# Patient Record
Sex: MALE | Race: OTHER | HISPANIC OR LATINO
[De-identification: names, ages, dates, MRNs, and addresses within clinical notes are randomized per-mention and may not be internally consistent; named-entity substitution may affect disease eponyms.]

---

## 2019-02-12 PROBLEM — Z00.00 ENCOUNTER FOR PREVENTIVE HEALTH EXAMINATION: Status: ACTIVE | Noted: 2019-02-12

## 2019-03-18 ENCOUNTER — RESULT CHARGE (OUTPATIENT)
Age: 59
End: 2019-03-18

## 2019-03-18 ENCOUNTER — RECORD ABSTRACTING (OUTPATIENT)
Age: 59
End: 2019-03-18

## 2019-03-18 DIAGNOSIS — Z82.3 FAMILY HISTORY OF STROKE: ICD-10-CM

## 2019-03-19 ENCOUNTER — APPOINTMENT (OUTPATIENT)
Dept: CARDIOLOGY | Facility: CLINIC | Age: 59
End: 2019-03-19
Payer: MEDICAID

## 2019-03-19 ENCOUNTER — NON-APPOINTMENT (OUTPATIENT)
Age: 59
End: 2019-03-19

## 2019-03-19 VITALS
RESPIRATION RATE: 16 BRPM | BODY MASS INDEX: 32.65 KG/M2 | HEART RATE: 68 BPM | HEIGHT: 65 IN | SYSTOLIC BLOOD PRESSURE: 160 MMHG | DIASTOLIC BLOOD PRESSURE: 90 MMHG | WEIGHT: 196 LBS

## 2019-03-19 DIAGNOSIS — Z87.898 PERSONAL HISTORY OF OTHER SPECIFIED CONDITIONS: ICD-10-CM

## 2019-03-19 DIAGNOSIS — K76.0 FATTY (CHANGE OF) LIVER, NOT ELSEWHERE CLASSIFIED: ICD-10-CM

## 2019-03-19 PROCEDURE — 99204 OFFICE O/P NEW MOD 45 MIN: CPT

## 2019-03-19 PROCEDURE — 93000 ELECTROCARDIOGRAM COMPLETE: CPT

## 2019-03-19 NOTE — HISTORY OF PRESENT ILLNESS
[FreeTextEntry1] : Patient is a 60yo M with HLD, HTN, obesity here for cardiac evaluation. Last seen in 2014.  and lives with wife. WOrks in kitchen, standing most of the day. NO regular exercise, weight the same. Feeling very tired and dizzy. Tired all day. Dizziness occurs when stands. HAs noted some leg swelling as well, worse at end of day. Snores a lot at night, uses CPAP for FRANK.  Patient denies PND/orthopnea/edema/palpitations/syncope/claudication \par \par ROS: GI negative, all others negative

## 2019-03-19 NOTE — DISCUSSION/SUMMARY
[FreeTextEntry1] : Patient is a 58yo M with HLD, obesity, HTN, IFG, fatty liver here for cardiac evaluation. HAS been vey fatigued/exhausted with reduced exercise tolerance recently. Has multiple cardiac risk factors and needs ischemic evaluation. ALso with abnormal ECG with conduction disease and LAE. Dizzy spells maybe orthostatic, will arrange evaluation for carotid disease as well. He did not take BP meds today and may not regularly due to side effects. Advised given significant HTN he needs to take regularly. \par \par 1. Echo and 2day nuclear stress test to evaluate fatigue/abnormal ECG with mulitple cardiac risk factors\par 2. Carotid to evaluate dizzy spells\par 3. ADvised he take BP meds daily, may need to add agents as well\par 4. Continue ASA/statin\par 5. Recommend aggressive diet and lifestyle modifications, counselled on weight loss\par 6. Will discuss regular exercise program at follow up as long as testing unremarkable \par 7. Follow up after testing  \par 8. Continue CPAP for FRANK, regular pulm follow up

## 2019-03-19 NOTE — ASSESSMENT
[FreeTextEntry1] : ECG: SR, LAE, IVCD, leftward axis\par \par \par  HDL 44   (2/2019)\par A1C 6.3, Fasting glucose 110 (2/2019)

## 2019-04-11 ENCOUNTER — APPOINTMENT (OUTPATIENT)
Dept: CARDIOLOGY | Facility: CLINIC | Age: 59
End: 2019-04-11
Payer: MEDICAID

## 2019-04-11 PROCEDURE — 93306 TTE W/DOPPLER COMPLETE: CPT

## 2019-04-11 PROCEDURE — 93880 EXTRACRANIAL BILAT STUDY: CPT

## 2019-04-25 ENCOUNTER — APPOINTMENT (OUTPATIENT)
Dept: CARDIOLOGY | Facility: CLINIC | Age: 59
End: 2019-04-25
Payer: MEDICAID

## 2019-04-25 PROCEDURE — 93015 CV STRESS TEST SUPVJ I&R: CPT

## 2019-04-25 PROCEDURE — A9500: CPT

## 2019-04-25 PROCEDURE — 78452 HT MUSCLE IMAGE SPECT MULT: CPT

## 2019-04-29 ENCOUNTER — APPOINTMENT (OUTPATIENT)
Dept: CARDIOLOGY | Facility: CLINIC | Age: 59
End: 2019-04-29
Payer: MEDICAID

## 2019-04-29 PROCEDURE — ZZZZZ: CPT

## 2019-05-02 ENCOUNTER — APPOINTMENT (OUTPATIENT)
Dept: CARDIOLOGY | Facility: CLINIC | Age: 59
End: 2019-05-02
Payer: MEDICAID

## 2019-05-02 VITALS
WEIGHT: 200 LBS | BODY MASS INDEX: 33.32 KG/M2 | SYSTOLIC BLOOD PRESSURE: 156 MMHG | HEART RATE: 62 BPM | RESPIRATION RATE: 14 BRPM | OXYGEN SATURATION: 96 % | DIASTOLIC BLOOD PRESSURE: 82 MMHG | HEIGHT: 65 IN

## 2019-05-02 PROCEDURE — 99214 OFFICE O/P EST MOD 30 MIN: CPT

## 2019-05-02 RX ORDER — ATENOLOL AND CHLORTHALIDONE 50; 25 MG/1; MG/1
50-25 TABLET ORAL
Refills: 0 | Status: DISCONTINUED | COMMUNITY
End: 2019-05-02

## 2019-05-02 NOTE — ASSESSMENT
[FreeTextEntry1] : \par \par ECHO 4/2019:\par 1. Borderline increased LV wall thickness.\par 2. Normal left ventricular internal cavity size.\par 3. Left ventricular ejection fraction, by visual estimation, is 60 to 65%.\par 4. Normal right ventricular size and systolic function.\par 5. Mildly dilated left atrium.\par 6. The right atrium is normal in size and structure.\par 7. Normal aortic valve, without any evidence of aortic stenosis or insufficiency.\par 8. Mild mitral valve regurgitation.\par 9. Mild tricuspid regurgitation.\par 10. Interatrial and interventricular septa appear intact\par 11. A prominent moderator band is noted in the RV.\par 12. There is no evidence of pericardial effusion.\par 13. Recommend clinical correlation with the above findings.\par \par CAROTID 4/2019:\par 1. All arteries were clearly visualized.\par 2. Normal LICA.\par 3. Normal JESUS ALBERTO.\par 4. Normal left ECA.\par 5. There is <50% stenosis of the right ECA.\par 6. There is antegrade flow in the right and left vertebral arteries.\par 7. Recommend clinical correlation with the above findings.\par \par EX NUCLEAR STRESS TEST 4/2019:\par 1. Normal Sestamibi myocardial perfusion SPECT imaging with artifact as noted above.\par 2. Left ventricular function was hyperdynamic with an EF of 72%.\par 3. The EKG was negative for ischemia.\par 4. The patient developed leg fatigue during the stress exam. The symptoms resolved with rest.\par 5. The test was terminated due to leg fatigue.\par 6. The blood pressure was hypertensive baseline with normal response to exercise.\par 7. The patient developed no dysrhythmias during exercise.\par 8. The patient's functional capacity was average.\par 9. The Duke Treadmill Score was 9, consistent with low risk.\par 10. When compared to prior study dated 2014, there has been no significant interval change.\par 11. Recommend clinical correlation with the above findings.\par \par  HDL 44   (2/2019)\par A1C 6.3, Fasting glucose 110 (2/2019)

## 2019-05-02 NOTE — HISTORY OF PRESENT ILLNESS
[FreeTextEntry1] : Patient is a 58yo M with HLD, HTN, obesity here for cardiac evaluation. Last seen in 2014.  and lives with wife. WOrks in kitchen, standing most of the day. NO regular exercise, weight the same. Feeling very tired and dizzy. Tired all day. Dizziness occurs when stands. HAs noted some leg swelling as well, worse at end of day. Snores a lot at night, uses CPAP for FRANK.  Patient denies PND/orthopnea/edema/palpitations/syncope/claudication  No new complaints since last visit and underwent cardiac testing. Has been having erectile dysfunction since starting BP meds. \par \par ROS: GI and  negative

## 2019-05-02 NOTE — DISCUSSION/SUMMARY
[FreeTextEntry1] : Patient is a 60yo M with HLD, obesity, HTN, IFG, fatty liver here for cardiac evaluation. Stress test without ischemia and Echo shows normal biventricular function and no clinically significant valvular abnormalities. MR no significant, repeat study 2-3 years. NO clinically significant carotid disease either. \par \par 1. Continue ASA/statin\par 2. Recommend 30 minutes moderate intensity aerobic activity 5 days per week \par 3. Recommend aggressive diet and lifestyle modifications \par 5. BP remains above goal, also ED possibly from atenolol. Will change to Bystolic 10mg daily and continue chlorthalidone. May need to add ARB as well. Needs to exercise and lose weight also\par 6. Follow up in 2 months to further manage HTN \par 7. Continue CPAP for FRANK, pulm follow up

## 2019-05-02 NOTE — PHYSICAL EXAM
[General Appearance - Well Developed] : well developed [Normal Oropharynx] : normal oropharynx [Normal Conjunctiva] : the conjunctiva exhibited no abnormalities [General Appearance - Well Nourished] : well nourished [Normal Jugular Venous V Waves Present] : normal jugular venous V waves present [Auscultation Breath Sounds / Voice Sounds] : lungs were clear to auscultation bilaterally [Respiration, Rhythm And Depth] : normal respiratory rhythm and effort [] : no respiratory distress [Heart Rate And Rhythm] : heart rate and rhythm were normal [Heart Sounds] : normal S1 and S2 [Murmurs] : no murmurs present [Abdomen Soft] : soft [Bowel Sounds] : normal bowel sounds [Abdomen Tenderness] : non-tender [Nail Clubbing] : no clubbing of the fingernails [Cyanosis, Localized] : no localized cyanosis [Abnormal Walk] : normal gait [Skin Color & Pigmentation] : normal skin color and pigmentation [Skin Turgor] : normal skin turgor [Oriented To Time, Place, And Person] : oriented to person, place, and time [Affect] : the affect was normal [FreeTextEntry1] : no edema

## 2019-05-03 ENCOUNTER — RX RENEWAL (OUTPATIENT)
Age: 59
End: 2019-05-03

## 2019-05-03 ENCOUNTER — MEDICATION RENEWAL (OUTPATIENT)
Age: 59
End: 2019-05-03

## 2019-05-03 RX ORDER — NEBIVOLOL HYDROCHLORIDE 10 MG/1
10 TABLET ORAL
Qty: 90 | Refills: 1 | Status: DISCONTINUED | COMMUNITY
Start: 2019-05-02 | End: 2019-05-03

## 2019-05-03 RX ORDER — KIT FOR THE PREPARATION OF TECHNETIUM TC99M SESTAMIBI 1 MG/5ML
INJECTION, POWDER, LYOPHILIZED, FOR SOLUTION PARENTERAL
Refills: 0 | Status: COMPLETED | OUTPATIENT
Start: 2019-05-03

## 2019-05-03 RX ADMIN — KIT FOR THE PREPARATION OF TECHNETIUM TC99M SESTAMIBI 0: 1 INJECTION, POWDER, LYOPHILIZED, FOR SOLUTION PARENTERAL at 00:00

## 2019-05-07 ENCOUNTER — RX CHANGE (OUTPATIENT)
Age: 59
End: 2019-05-07

## 2019-07-18 ENCOUNTER — APPOINTMENT (OUTPATIENT)
Dept: NEUROSURGERY | Facility: CLINIC | Age: 59
End: 2019-07-18
Payer: MEDICAID

## 2019-07-18 ENCOUNTER — NON-APPOINTMENT (OUTPATIENT)
Age: 59
End: 2019-07-18

## 2019-07-18 ENCOUNTER — APPOINTMENT (OUTPATIENT)
Dept: CARDIOLOGY | Facility: CLINIC | Age: 59
End: 2019-07-18
Payer: MEDICAID

## 2019-07-18 VITALS
BODY MASS INDEX: 34.15 KG/M2 | WEIGHT: 200 LBS | HEIGHT: 64 IN | RESPIRATION RATE: 12 BRPM | SYSTOLIC BLOOD PRESSURE: 150 MMHG | HEART RATE: 77 BPM | DIASTOLIC BLOOD PRESSURE: 70 MMHG

## 2019-07-18 VITALS
WEIGHT: 198 LBS | TEMPERATURE: 98.5 F | SYSTOLIC BLOOD PRESSURE: 168 MMHG | BODY MASS INDEX: 33.8 KG/M2 | DIASTOLIC BLOOD PRESSURE: 75 MMHG | HEART RATE: 76 BPM | HEIGHT: 64 IN

## 2019-07-18 VITALS
BODY MASS INDEX: 34.15 KG/M2 | SYSTOLIC BLOOD PRESSURE: 150 MMHG | HEIGHT: 64 IN | DIASTOLIC BLOOD PRESSURE: 70 MMHG | HEART RATE: 77 BPM | WEIGHT: 200 LBS | RESPIRATION RATE: 16 BRPM | OXYGEN SATURATION: 94 %

## 2019-07-18 DIAGNOSIS — Z86.39 PERSONAL HISTORY OF OTHER ENDOCRINE, NUTRITIONAL AND METABOLIC DISEASE: ICD-10-CM

## 2019-07-18 DIAGNOSIS — G89.29 PAIN IN RIGHT SHOULDER: ICD-10-CM

## 2019-07-18 DIAGNOSIS — M25.511 PAIN IN RIGHT SHOULDER: ICD-10-CM

## 2019-07-18 PROCEDURE — 99214 OFFICE O/P EST MOD 30 MIN: CPT

## 2019-07-18 PROCEDURE — 93000 ELECTROCARDIOGRAM COMPLETE: CPT

## 2019-07-18 PROCEDURE — 99203 OFFICE O/P NEW LOW 30 MIN: CPT

## 2019-07-18 RX ORDER — BUSPIRONE HYDROCHLORIDE 5 MG/1
5 TABLET ORAL 3 TIMES DAILY
Refills: 0 | Status: DISCONTINUED | COMMUNITY
End: 2019-07-18

## 2019-07-18 RX ORDER — GABAPENTIN 300 MG
300 TABLET ORAL
Refills: 0 | Status: DISCONTINUED | COMMUNITY
End: 2019-07-18

## 2019-07-18 RX ORDER — DICLOFENAC SODIUM 50 MG/1
50 TABLET, DELAYED RELEASE ORAL
Refills: 0 | Status: DISCONTINUED | COMMUNITY
End: 2019-07-18

## 2019-07-18 RX ORDER — ATORVASTATIN CALCIUM 40 MG/1
40 TABLET, FILM COATED ORAL
Refills: 0 | Status: DISCONTINUED | COMMUNITY
End: 2019-07-18

## 2019-07-18 RX ORDER — DULOXETINE HYDROCHLORIDE 60 MG/1
60 CAPSULE, DELAYED RELEASE ORAL
Refills: 0 | Status: DISCONTINUED | COMMUNITY
End: 2019-07-18

## 2019-07-18 NOTE — REASON FOR VISIT
[New Patient Visit] : a new patient visit [Referred By: _________] : Patient was referred by HETAL [FreeTextEntry1] : radiculopathy, cervical region; right shoulder pain

## 2019-07-18 NOTE — HISTORY OF PRESENT ILLNESS
[FreeTextEntry1] : Patient is a 60yo M with HLD, HTN, obesity here for cardiac follow up.   and lives with wife. HAd negative work up for dizziness/fatigue/tiredness earlier this year. Doing fairly well since. No CP/SOB. Patient denies PND/orthopnea/edema/palpitations/syncope/claudication. Beta blocker changed to metoprolol ER after last visit due to ED from atenolol. (bystolic not covered by insurance). ED better with change in medication. \par \par ROS: GI and  negative

## 2019-07-18 NOTE — DISCUSSION/SUMMARY
[FreeTextEntry1] : Patient is a 60yo M with HLD, obesity, HTN, IFG, fatty liver here for cardiac evaluation. Stress test without ischemia and Echo shows normal biventricular function and no clinically significant valvular abnormalities. MR no significant, repeat study 2-3 years. NO clinically significant carotid disease either. ECG unchanged. REmains dizzy in am, ? BP higher than. Could also be white coat and BP lower at home but more suspicious of the latter. Will add BP med and he will check BP at home\par \par 1. Patient has been off ASA/statin, ? why, Ran out apparently. Restart ASA/atorvastatin 40, check lipids \par 2. Recommend 30 minutes moderate intensity aerobic activity 5 days per week \par 3. Recommend aggressive diet and lifestyle modifications \par 4. Start Valsartan 80mg daily, check BMP/Mg 2 weeks\par 5. Follow up 6 weeks \par 6. Pulm follow up for FRANK, Not on CPAP now\par 7. Monitor BP at home BID for next 2 weeks

## 2019-07-18 NOTE — DATA REVIEWED
[de-identified] : MRI of the cervical spine was reviewed along with the official report.  There is relatively mild DDD with left foraminal stenosis at C6-7.  There is no severe right-sided stenosis.  There is no severe cord compression or signal change.

## 2019-07-18 NOTE — HISTORY OF PRESENT ILLNESS
[> 3 months] : more  than 3 months [FreeTextEntry1] : right shoulder pain [de-identified] : Mr. Reid presents for neurosurgical evaluation of right shoulder pain.  He states that the pain is chronic and does not recall any clear inciting incident or trigger.  The pain is relatively constant and ranges from 7/10 at its best to 9/10 at its worst.  The pain is localized to the right shoulder.  It is relieved with sleeping face up and exacerbated by laying on his right side.  It is also exacerbated with motion.  The patient denies significant neck or radicular pain.  He denies any numbness, tingling or weakness of the extremities.  He does not have any incontinence and is able to ambulate steadily.  See office intake form for full ROS.

## 2019-07-18 NOTE — PHYSICAL EXAM
[General Appearance - Alert] : alert [General Appearance - In No Acute Distress] : in no acute distress [General Appearance - Well Nourished] : well nourished [General Appearance - Well Developed] : well developed [General Appearance - Well-Appearing] : healthy appearing [Oriented To Time, Place, And Person] : oriented to person, place, and time [Person] : oriented to person [Place] : oriented to place [Time] : oriented to time [Short Term Intact] : short term memory intact [Concentration Intact] : normal concentrating ability [Fluency] : fluency intact [Cranial Nerves Optic (II)] : visual acuity intact bilaterally,  pupils equal round and reactive to light [Cranial Nerves Oculomotor (III)] : extraocular motion intact [Cranial Nerves Facial (VII)] : face symmetrical [Cranial Nerves Hypoglossal (XII)] : there was no tongue deviation with protrusion [Motor Tone] : muscle tone was normal in all four extremities [Motor Strength] : muscle strength was normal in all four extremities [Sensation Tactile Decrease] : light touch was intact [Abnormal Walk] : normal gait [___] : absent on the right [___] : absent on the left [FreeTextEntry6] : UE and LE 5/5 bilaterally [FreeTextEntry9] : no Olivera's bilaterally

## 2019-07-18 NOTE — ASSESSMENT
[FreeTextEntry1] : ECG: SR, LAFB \par \par ECHO 4/2019:\par 1. Borderline increased LV wall thickness.\par 2. Normal left ventricular internal cavity size.\par 3. Left ventricular ejection fraction, by visual estimation, is 60 to 65%.\par 4. Normal right ventricular size and systolic function.\par 5. Mildly dilated left atrium.\par 6. The right atrium is normal in size and structure.\par 7. Normal aortic valve, without any evidence of aortic stenosis or insufficiency.\par 8. Mild mitral valve regurgitation.\par 9. Mild tricuspid regurgitation.\par 10. Interatrial and interventricular septa appear intact\par 11. A prominent moderator band is noted in the RV.\par 12. There is no evidence of pericardial effusion.\par 13. Recommend clinical correlation with the above findings.\par \par CAROTID 4/2019:\par 1. All arteries were clearly visualized.\par 2. Normal LICA.\par 3. Normal JESUS ALBERTO.\par 4. Normal left ECA.\par 5. There is <50% stenosis of the right ECA.\par 6. There is antegrade flow in the right and left vertebral arteries.\par 7. Recommend clinical correlation with the above findings.\par \par EX NUCLEAR STRESS TEST 4/2019:\par 1. Normal Sestamibi myocardial perfusion SPECT imaging with artifact as noted above.\par 2. Left ventricular function was hyperdynamic with an EF of 72%.\par 3. The EKG was negative for ischemia.\par 4. The patient developed leg fatigue during the stress exam. The symptoms resolved with rest.\par 5. The test was terminated due to leg fatigue.\par 6. The blood pressure was hypertensive baseline with normal response to exercise.\par 7. The patient developed no dysrhythmias during exercise.\par 8. The patient's functional capacity was average.\par 9. The Duke Treadmill Score was 9, consistent with low risk.\par 10. When compared to prior study dated 2014, there has been no significant interval change.\par 11. Recommend clinical correlation with the above findings.\par \par  HDL 44   (2/2019)\par A1C 6.3, Fasting glucose 110 (2/2019)

## 2019-07-30 ENCOUNTER — RX RENEWAL (OUTPATIENT)
Age: 59
End: 2019-07-30

## 2019-09-09 ENCOUNTER — APPOINTMENT (OUTPATIENT)
Dept: CARDIOLOGY | Facility: CLINIC | Age: 59
End: 2019-09-09

## 2019-10-30 ENCOUNTER — RX RENEWAL (OUTPATIENT)
Age: 59
End: 2019-10-30

## 2019-11-15 ENCOUNTER — APPOINTMENT (OUTPATIENT)
Dept: GASTROENTEROLOGY | Facility: CLINIC | Age: 59
End: 2019-11-15
Payer: MEDICAID

## 2019-11-15 VITALS
DIASTOLIC BLOOD PRESSURE: 70 MMHG | SYSTOLIC BLOOD PRESSURE: 167 MMHG | WEIGHT: 196 LBS | HEART RATE: 79 BPM | BODY MASS INDEX: 33.46 KG/M2 | HEIGHT: 64 IN

## 2019-11-15 PROCEDURE — 82272 OCCULT BLD FECES 1-3 TESTS: CPT

## 2019-11-15 PROCEDURE — 99204 OFFICE O/P NEW MOD 45 MIN: CPT

## 2019-11-15 NOTE — HISTORY OF PRESENT ILLNESS
[Nausea] : denies nausea [None] : had no significant interval events [Constipation] : denies constipation [Yellow Skin Or Eyes (Jaundice)] : denies jaundice [Vomiting] : denies vomiting [Diarrhea] : denies diarrhea [Rectal Pain] : denies rectal pain [Abdominal Swelling] : denies abdominal swelling [Heartburn] : heartburn [Abdominal Pain] : abdominal pain [Wt Gain ___ Lbs] : no recent weight gain [Wt Loss ___ Lbs] : no recent weight loss [GERD] : no gastroesophageal reflux disease [Hiatus Hernia] : no hiatus hernia [Peptic Ulcer Disease] : no peptic ulcer disease [Pancreatitis] : no pancreatitis [Cholelithiasis] : no cholelithiasis [Kidney Stone] : no kidney stone [Inflammatory Bowel Disease] : no inflammatory bowel disease [Irritable Bowel Syndrome] : no irritable bowel syndrome [Diverticulitis] : no diverticulitis [Alcohol Abuse] : no alcohol abuse [Malignancy] : no malignancy [Appendectomy] : no appendectomy [Abdominal Surgery] : no abdominal surgery [Cholecystectomy] : no cholecystectomy [de-identified] : roughly 6 years ago [de-identified] : colonoscopy [de-identified] : Patient presents today for evaluation of chronic postprandial dyspepsia and intermittent low-volume hematochezia status post a negative screening colonoscopy in 2013. He states that his rectal bleeding started roughly 1 month ago and alternates between bright red blood and dark blood mixed with stool in the toilet bowl with no associated abdominal or rectal pain. He also complains of postprandial dyspepsia and bloating and has had no prior upper endoscopies.He has had some associated reflux symptoms with postprandial bloating and pain. [de-identified] : rectal bleeding and dyspepsia

## 2019-11-15 NOTE — REVIEW OF SYSTEMS
[Negative] : Endocrine [As Noted in HPI] : as noted in HPI [Abdominal Pain] : abdominal pain [Vomiting] : no vomiting [Diarrhea] : no diarrhea [Constipation] : no constipation [Heartburn] : no heartburn [Melena] : no melena [FreeTextEntry7] : dyspepsia and rectal bleeding

## 2019-11-15 NOTE — PHYSICAL EXAM
[General Appearance - In No Acute Distress] : in no acute distress [General Appearance - Alert] : alert [General Appearance - Well Nourished] : well nourished [General Appearance - Well Developed] : well developed [General Appearance - Well-Appearing] : healthy appearing [Sclera] : the sclera and conjunctiva were normal [PERRL With Normal Accommodation] : pupils were equal in size, round, and reactive to light [Extraocular Movements] : extraocular movements were intact [Outer Ear] : the ears and nose were normal in appearance [Oropharynx] : the oropharynx was normal [Neck Appearance] : the appearance of the neck was normal [Neck Cervical Mass (___cm)] : no neck mass was observed [Thyroid Diffuse Enlargement] : the thyroid was not enlarged [Jugular Venous Distention Increased] : there was no jugular-venous distention [Thyroid Nodule] : there were no palpable thyroid nodules [Auscultation Breath Sounds / Voice Sounds] : lungs were clear to auscultation bilaterally [Heart Sounds] : normal S1 and S2 [Heart Sounds Gallop] : no gallops [Heart Rate And Rhythm] : heart rate was normal and rhythm regular [Murmurs] : no murmurs [Heart Sounds Pericardial Friction Rub] : no pericardial rub [Bowel Sounds] : normal bowel sounds [Abdomen Soft] : soft [] : no hepato-splenomegaly [Abdomen Mass (___ Cm)] : no abdominal mass palpated [Epigastric] : in the epigastric area [Normal Sphincter Tone] : normal sphincter tone [No Rectal Mass] : no rectal mass [Prostate Enlarged] : was enlarged [Musculoskeletal - Swelling] : no joint swelling seen [Abnormal Walk] : normal gait [No CVA Tenderness] : no ~M costovertebral angle tenderness [Skin Turgor] : normal skin turgor [Skin Color & Pigmentation] : normal skin color and pigmentation [Oriented To Time, Place, And Person] : oriented to person, place, and time [Periumbilical] : not in the periumbilical area [Suprapubic] : not in the suprapubic area [RUQ] : not in the in the right upper quadrant [RLQ] : not in the right lower quadrant [LLQ] : not in the left lower quadrant [LUQ] : not in the left upper quadrant [Internal Hemorrhoid] : no internal hemorrhoids [Occult Blood Positive] : stool was negative for occult blood [External Hemorrhoid] : no external hemorrhoids [FreeTextEntry1] : Hemoccult-negative stool [Prostate Nodule] : did not have a nodule [Prostate Tenderness] : was not tender [Prostate Fluctuant] : was not fluctuant

## 2019-11-15 NOTE — REASON FOR VISIT
[Initial Evaluation] : an initial evaluation [FreeTextEntry1] : for chronic postprandial dyspepsia and rectal bleeding

## 2019-11-15 NOTE — ASSESSMENT
[FreeTextEntry1] : Impression: Low-volume rectal bleeding rule out colorectal neoplasm versus proctitis versus internal hemorrhoidal bleeding. Chronic postprandial dyspepsia rule out ulcer disease and/or gastritis and/or esophagitis and/or H. pylori infection.\par \par Recommendations: Colonoscopy and upper endoscopy were advised for further evaluation of the above. The risks versus benefits of both colonoscopy and upper endoscopy and intravenous sedation, and alternative testing such as virtual colonoscopy and upper GI series, were individually explained to the patient today who appeared to understand all of the above and was agreeable to proceeding with both colonoscopy and upper endoscopy. His ASA classification is 2. He will be prepped with Trilyte for the colonoscopy and is medically optimized for both upper endoscopy and colonoscopy and appeared to understand all of the above instructions and management plan.

## 2019-12-12 ENCOUNTER — OUTPATIENT (OUTPATIENT)
Dept: OUTPATIENT SERVICES | Facility: HOSPITAL | Age: 59
LOS: 1 days | End: 2019-12-12
Payer: COMMERCIAL

## 2019-12-12 ENCOUNTER — APPOINTMENT (OUTPATIENT)
Dept: GASTROENTEROLOGY | Facility: GI CENTER | Age: 59
End: 2019-12-12
Payer: MEDICAID

## 2019-12-12 DIAGNOSIS — K62.5 HEMORRHAGE OF ANUS AND RECTUM: ICD-10-CM

## 2019-12-12 PROCEDURE — 45378 DIAGNOSTIC COLONOSCOPY: CPT

## 2019-12-12 NOTE — PROCEDURE
[Hematochezia] : hematochezia [Procedure Explained] : The procedure was explained [Allergies Reviewed] : allergies reviewed. [Risks] : Risks [Benefits] : benefits [Alternatives] : alternatives [Bleeding] : bleeding risk [Infection] : risk of infection [Consent Obtained] : written consent was obtained prior to the procedure and is detailed in the patient's record [Patient] : the patient [Approved Diet Followed] : the patient avoided solid foods and adhered to the approved diet list for 24 hours prior to the procedure [Bowel Prep Kit] : the patient took the appropriate bowel preparation kit as directed [Automated Blood Pressure Cuff] : automated blood pressure cuff [Cardiac Monitor] : cardiac monitor [Pulse Oximeter] : pulse oximeter [Propofol ___ mg IV] : Propofol [unfilled] ~Umg intravenously [___ L/min Oxygen via NC] : [unfilled] ~Uliters/minute oxygen via nasal cannula [2] : 2 [Sedation Clearance] : the patient was cleared for moderate sedation [Time started: ___] : Start Time:  [unfilled] [Prep Qualtiy: ___] : Prep Quality:  [unfilled] [Withdrawal Time: ___] : Withdrawal Time:  [unfilled] [Time Completed: ___] : Completion Time:  [unfilled] [Performed By: ___] : Performed by:  HETAL [Left Lateral Decubitus] : The patient was positioned in the left lateral decubitus position [Moderately Enlarged Prostate] : a moderately enlarged prostate [Cecum (Landmarks/Transillum)] : and guided to the cecum which was identified by the anatomic landmarks of the appendiceal orifice and ileocecal valve and by transillumination in the right lower quadrant [No Difficulty] : without difficulty [Insufflated] : insufflated [Single Pass Needed] : after a single pass [Retroflex View] : a retroflex view of the rectum was performed [Normal] : Normal [Hemorrhoids] : hemorrhoids [Tolerated Well] : the patient tolerated the procedure well [No Complications] : There were no complications [Vital Signs Stable] : the vital signs were stable [Abnormal Rectum] : a normal rectum [External Hemorrhoids] : no external hemorrhoids [Patient Rotated Into Alternating Positions] : the patient was not rotated [de-identified] : [id number] [de-identified] : Internal hemorrhoids noted on retroflexion. [de-identified] : Internal hemorrhoids o/w normal colonoscopy to the cecum.

## 2019-12-12 NOTE — PHYSICAL EXAM
[General Appearance - Well Nourished] : well nourished [General Appearance - Alert] : alert [General Appearance - In No Acute Distress] : in no acute distress [General Appearance - Well Developed] : well developed [General Appearance - Well-Appearing] : healthy appearing [Sclera] : the sclera and conjunctiva were normal [PERRL With Normal Accommodation] : pupils were equal in size, round, and reactive to light [Extraocular Movements] : extraocular movements were intact [Outer Ear] : the ears and nose were normal in appearance [Oropharynx] : the oropharynx was normal [Neck Appearance] : the appearance of the neck was normal [Neck Cervical Mass (___cm)] : no neck mass was observed [Jugular Venous Distention Increased] : there was no jugular-venous distention [Thyroid Nodule] : there were no palpable thyroid nodules [Thyroid Diffuse Enlargement] : the thyroid was not enlarged [Heart Rate And Rhythm] : heart rate was normal and rhythm regular [Auscultation Breath Sounds / Voice Sounds] : lungs were clear to auscultation bilaterally [Heart Sounds] : normal S1 and S2 [Heart Sounds Gallop] : no gallops [Murmurs] : no murmurs [Heart Sounds Pericardial Friction Rub] : no pericardial rub [Bowel Sounds] : normal bowel sounds [Abdomen Soft] : soft [] : no hepato-splenomegaly [Abdomen Mass (___ Cm)] : no abdominal mass palpated [Epigastric] : in the epigastric area [Normal Sphincter Tone] : normal sphincter tone [No Rectal Mass] : no rectal mass [No CVA Tenderness] : no ~M costovertebral angle tenderness [Abnormal Walk] : normal gait [Musculoskeletal - Swelling] : no joint swelling seen [Skin Color & Pigmentation] : normal skin color and pigmentation [Skin Turgor] : normal skin turgor [Oriented To Time, Place, And Person] : oriented to person, place, and time [Periumbilical] : not in the periumbilical area [Suprapubic] : not in the suprapubic area [RUQ] : not in the in the right upper quadrant [RLQ] : not in the right lower quadrant [LUQ] : not in the left upper quadrant [LLQ] : not in the left lower quadrant [Internal Hemorrhoid] : no internal hemorrhoids [External Hemorrhoid] : no external hemorrhoids [Occult Blood Positive] : stool was negative for occult blood [FreeTextEntry1] : Hemoccult-negative stool

## 2019-12-12 NOTE — PHYSICAL EXAM
[General Appearance - Well Nourished] : well nourished [General Appearance - Alert] : alert [General Appearance - In No Acute Distress] : in no acute distress [General Appearance - Well Developed] : well developed [General Appearance - Well-Appearing] : healthy appearing [Sclera] : the sclera and conjunctiva were normal [PERRL With Normal Accommodation] : pupils were equal in size, round, and reactive to light [Extraocular Movements] : extraocular movements were intact [Outer Ear] : the ears and nose were normal in appearance [Oropharynx] : the oropharynx was normal [Neck Appearance] : the appearance of the neck was normal [Neck Cervical Mass (___cm)] : no neck mass was observed [Jugular Venous Distention Increased] : there was no jugular-venous distention [Thyroid Nodule] : there were no palpable thyroid nodules [Thyroid Diffuse Enlargement] : the thyroid was not enlarged [Heart Rate And Rhythm] : heart rate was normal and rhythm regular [Auscultation Breath Sounds / Voice Sounds] : lungs were clear to auscultation bilaterally [Heart Sounds Gallop] : no gallops [Heart Sounds] : normal S1 and S2 [Murmurs] : no murmurs [Heart Sounds Pericardial Friction Rub] : no pericardial rub [Bowel Sounds] : normal bowel sounds [Abdomen Soft] : soft [] : no hepato-splenomegaly [Epigastric] : in the epigastric area [Abdomen Mass (___ Cm)] : no abdominal mass palpated [Normal Sphincter Tone] : normal sphincter tone [No Rectal Mass] : no rectal mass [No CVA Tenderness] : no ~M costovertebral angle tenderness [Abnormal Walk] : normal gait [Skin Color & Pigmentation] : normal skin color and pigmentation [Musculoskeletal - Swelling] : no joint swelling seen [Skin Turgor] : normal skin turgor [Oriented To Time, Place, And Person] : oriented to person, place, and time [Periumbilical] : not in the periumbilical area [Suprapubic] : not in the suprapubic area [RUQ] : not in the in the right upper quadrant [RLQ] : not in the right lower quadrant [LUQ] : not in the left upper quadrant [LLQ] : not in the left lower quadrant [Internal Hemorrhoid] : no internal hemorrhoids [External Hemorrhoid] : no external hemorrhoids [Occult Blood Positive] : stool was negative for occult blood [FreeTextEntry1] : Hemoccult-negative stool

## 2019-12-12 NOTE — PROCEDURE
[Hematochezia] : hematochezia [Procedure Explained] : The procedure was explained [Allergies Reviewed] : allergies reviewed. [Risks] : Risks [Benefits] : benefits [Alternatives] : alternatives [Bleeding] : bleeding risk [Infection] : risk of infection [Consent Obtained] : written consent was obtained prior to the procedure and is detailed in the patient's record [Patient] : the patient [Bowel Prep Kit] : the patient took the appropriate bowel preparation kit as directed [Approved Diet Followed] : the patient avoided solid foods and adhered to the approved diet list for 24 hours prior to the procedure [Automated Blood Pressure Cuff] : automated blood pressure cuff [Cardiac Monitor] : cardiac monitor [Pulse Oximeter] : pulse oximeter [Propofol ___ mg IV] : Propofol [unfilled] ~Umg intravenously [___ L/min Oxygen via NC] : [unfilled] ~Uliters/minute oxygen via nasal cannula [2] : 2 [Sedation Clearance] : the patient was cleared for moderate sedation [Time started: ___] : Start Time:  [unfilled] [Prep Qualtiy: ___] : Prep Quality:  [unfilled] [Withdrawal Time: ___] : Withdrawal Time:  [unfilled] [Time Completed: ___] : Completion Time:  [unfilled] [Performed By: ___] : Performed by:  HETAL [Left Lateral Decubitus] : The patient was positioned in the left lateral decubitus position [Moderately Enlarged Prostate] : a moderately enlarged prostate [Cecum (Landmarks/Transillum)] : and guided to the cecum which was identified by the anatomic landmarks of the appendiceal orifice and ileocecal valve and by transillumination in the right lower quadrant [No Difficulty] : without difficulty [Insufflated] : insufflated [Single Pass Needed] : after a single pass [Retroflex View] : a retroflex view of the rectum was performed [Normal] : Normal [Hemorrhoids] : hemorrhoids [Tolerated Well] : the patient tolerated the procedure well [No Complications] : There were no complications [Vital Signs Stable] : the vital signs were stable [Patient Rotated Into Alternating Positions] : the patient was not rotated [External Hemorrhoids] : no external hemorrhoids [Abnormal Rectum] : a normal rectum [de-identified] : [id number] [de-identified] : Internal hemorrhoids noted on retroflexion. [de-identified] : Internal hemorrhoids o/w normal colonoscopy to the cecum.

## 2019-12-12 NOTE — REVIEW OF SYSTEMS
[Negative] : Endocrine [Abdominal Pain] : no abdominal pain [Vomiting] : no vomiting [Diarrhea] : no diarrhea [Constipation] : no constipation [Heartburn] : no heartburn [Melena] : no melena [FreeTextEntry7] : rectal bleeding

## 2019-12-12 NOTE — ASSESSMENT
[FreeTextEntry1] : HF diet. RB likely secondary to hemorrhoids. Topical Rx PRN. Repeat screening colonoscopy in ten years.

## 2019-12-12 NOTE — REVIEW OF SYSTEMS
[Negative] : Psychiatric [Abdominal Pain] : no abdominal pain [Vomiting] : no vomiting [Constipation] : no constipation [Diarrhea] : no diarrhea [Heartburn] : no heartburn [Melena] : no melena [FreeTextEntry7] : rectal bleeding

## 2020-01-09 ENCOUNTER — OUTPATIENT (OUTPATIENT)
Dept: OUTPATIENT SERVICES | Facility: HOSPITAL | Age: 60
LOS: 1 days | End: 2020-01-09
Payer: COMMERCIAL

## 2020-01-09 ENCOUNTER — APPOINTMENT (OUTPATIENT)
Dept: GASTROENTEROLOGY | Facility: GI CENTER | Age: 60
End: 2020-01-09
Payer: MEDICAID

## 2020-01-09 ENCOUNTER — RESULT REVIEW (OUTPATIENT)
Age: 60
End: 2020-01-09

## 2020-01-09 DIAGNOSIS — R10.13 EPIGASTRIC PAIN: ICD-10-CM

## 2020-01-09 PROCEDURE — 88342 IMHCHEM/IMCYTCHM 1ST ANTB: CPT | Mod: 26

## 2020-01-09 PROCEDURE — 88305 TISSUE EXAM BY PATHOLOGIST: CPT

## 2020-01-09 PROCEDURE — 43239 EGD BIOPSY SINGLE/MULTIPLE: CPT

## 2020-01-09 PROCEDURE — 88342 IMHCHEM/IMCYTCHM 1ST ANTB: CPT

## 2020-01-09 PROCEDURE — 88305 TISSUE EXAM BY PATHOLOGIST: CPT | Mod: 26

## 2020-01-09 NOTE — PROCEDURE
[With Biopsy] : with biopsy [Dyspepsia] : dyspepsia [Allergies Reviewed] : allergies reviewed. [Procedure Explained] : The procedure was explained [Risks] : Risks [Benefits] : benefits [Alternatives] : alternatives [Consent Obtained] : written consent was obtained prior to the procedure and is detailed in the patient's record [Patient] : the patient [Automated Blood Pressure Cuff] : automated blood pressure cuff [Propofol ___ mg IV] : Propofol [unfilled] ~Umg intravenously [Cardiac Monitor] : cardiac monitor [___ L/min Oxygen via NC] : [unfilled] ~Uliters/minute oxygen via nasal cannula [3] : 3 [Sedation Clearance] : the patient was cleared for moderate sedation [Time started: ___] : Start Time:  [unfilled] [Time Completed: ___] : Completion Time:  [unfilled] [Performed By: ___] : Performed by:  HETAL [Erythema] : erythema [Sent to Pathology] : was sent to pathology for analysis [Normal] : Normal [Tolerated Well] : the patient tolerated the procedure well [Vital Signs Stable] : the vital signs were stable [de-identified] : Moderate gastritis seen [de-identified] : Moderate gastritis seen and biopsied for H. Pylori. [de-identified] : Moderate gastritis seen and biopsied for H. Pylori. [de-identified] : Moderate gastritis seen and biopsied for H. Pylori.

## 2020-01-09 NOTE — PHYSICAL EXAM
[General Appearance - Alert] : alert [General Appearance - In No Acute Distress] : in no acute distress [General Appearance - Well Nourished] : well nourished [General Appearance - Well Developed] : well developed [General Appearance - Well-Appearing] : healthy appearing [Sclera] : the sclera and conjunctiva were normal [PERRL With Normal Accommodation] : pupils were equal in size, round, and reactive to light [Extraocular Movements] : extraocular movements were intact [Oropharynx] : the oropharynx was normal [Outer Ear] : the ears and nose were normal in appearance [Neck Appearance] : the appearance of the neck was normal [Thyroid Diffuse Enlargement] : the thyroid was not enlarged [Thyroid Nodule] : there were no palpable thyroid nodules [Neck Cervical Mass (___cm)] : no neck mass was observed [Jugular Venous Distention Increased] : there was no jugular-venous distention [Auscultation Breath Sounds / Voice Sounds] : lungs were clear to auscultation bilaterally [Heart Rate And Rhythm] : heart rate was normal and rhythm regular [Murmurs] : no murmurs [Heart Sounds] : normal S1 and S2 [Heart Sounds Gallop] : no gallops [Bowel Sounds] : normal bowel sounds [Heart Sounds Pericardial Friction Rub] : no pericardial rub [Abdomen Soft] : soft [] : no hepato-splenomegaly [Epigastric] : in the epigastric area [Abdomen Mass (___ Cm)] : no abdominal mass palpated [Normal Sphincter Tone] : normal sphincter tone [No Rectal Mass] : no rectal mass [No CVA Tenderness] : no ~M costovertebral angle tenderness [Abnormal Walk] : normal gait [Musculoskeletal - Swelling] : no joint swelling seen [Skin Color & Pigmentation] : normal skin color and pigmentation [Skin Turgor] : normal skin turgor [Oriented To Time, Place, And Person] : oriented to person, place, and time [Periumbilical] : not in the periumbilical area [Suprapubic] : not in the suprapubic area [RUQ] : not in the in the right upper quadrant [RLQ] : not in the right lower quadrant [LUQ] : not in the left upper quadrant [LLQ] : not in the left lower quadrant [Internal Hemorrhoid] : no internal hemorrhoids [External Hemorrhoid] : no external hemorrhoids [Occult Blood Positive] : stool was negative for occult blood [FreeTextEntry1] : Hemoccult-negative stool

## 2020-01-09 NOTE — REASON FOR VISIT
[Procedure: _________] : a [unfilled] procedure visit [Endoscopy] : an endoscopy [FreeTextEntry2] : Pt. is here for EGD for chronic dyspepsia

## 2020-01-09 NOTE — ASSESSMENT
[FreeTextEntry1] : Omeprazole 40 mg./d. to Rx the above gastritis pending H. Pylori biopsy results. RTO in 4 weeks.

## 2020-01-09 NOTE — PROCEDURE
[With Biopsy] : with biopsy [Dyspepsia] : dyspepsia [Allergies Reviewed] : allergies reviewed. [Procedure Explained] : The procedure was explained [Risks] : Risks [Benefits] : benefits [Alternatives] : alternatives [Consent Obtained] : written consent was obtained prior to the procedure and is detailed in the patient's record [Patient] : the patient [Automated Blood Pressure Cuff] : automated blood pressure cuff [Cardiac Monitor] : cardiac monitor [Propofol ___ mg IV] : Propofol [unfilled] ~Umg intravenously [___ L/min Oxygen via NC] : [unfilled] ~Uliters/minute oxygen via nasal cannula [3] : 3 [Time started: ___] : Start Time:  [unfilled] [Sedation Clearance] : the patient was cleared for moderate sedation [Performed By: ___] : Performed by:  HETAL [Time Completed: ___] : Completion Time:  [unfilled] [Erythema] : erythema [Normal] : Normal [Sent to Pathology] : was sent to pathology for analysis [Tolerated Well] : the patient tolerated the procedure well [Vital Signs Stable] : the vital signs were stable [de-identified] : Moderate gastritis seen [de-identified] : Moderate gastritis seen and biopsied for H. Pylori. [de-identified] : Moderate gastritis seen and biopsied for H. Pylori. [de-identified] : Moderate gastritis seen and biopsied for H. Pylori.

## 2020-01-15 LAB — SURGICAL PATHOLOGY STUDY: SIGNIFICANT CHANGE UP

## 2020-01-17 ENCOUNTER — APPOINTMENT (OUTPATIENT)
Dept: GASTROENTEROLOGY | Facility: CLINIC | Age: 60
End: 2020-01-17
Payer: MEDICAID

## 2020-01-17 VITALS
DIASTOLIC BLOOD PRESSURE: 72 MMHG | SYSTOLIC BLOOD PRESSURE: 144 MMHG | HEIGHT: 64 IN | WEIGHT: 205 LBS | HEART RATE: 64 BPM | BODY MASS INDEX: 35 KG/M2

## 2020-01-17 PROCEDURE — 99214 OFFICE O/P EST MOD 30 MIN: CPT

## 2020-01-17 NOTE — ASSESSMENT
[FreeTextEntry1] : Impression: Chronic postprandial dyspepsia likely secondary to H. pylori gastritis which has not yet been treated.\par \par Recommendations: Prescriptions for omeprazole 20 mg b.i.d. and amoxicillin 1 g b.i.d. and clarithromycin 500 mg b.i.d. for 2 weeks were sent as pharmacy for H. pylori eradication. His toe for an H. pylori urea breath test 2 weeks after the above therapy has been completed and return here in 6 weeks for followup evaluation. He appeared to understand all of the above instructions, inflammation, and management plan which were explained to him today in Chinese by myself who speaks Chinese.\par

## 2020-01-17 NOTE — PHYSICAL EXAM
[General Appearance - Alert] : alert [General Appearance - In No Acute Distress] : in no acute distress [General Appearance - Well Nourished] : well nourished [General Appearance - Well Developed] : well developed [General Appearance - Well-Appearing] : healthy appearing [Sclera] : the sclera and conjunctiva were normal [PERRL With Normal Accommodation] : pupils were equal in size, round, and reactive to light [Extraocular Movements] : extraocular movements were intact [Outer Ear] : the ears and nose were normal in appearance [Oropharynx] : the oropharynx was normal [Neck Appearance] : the appearance of the neck was normal [Neck Cervical Mass (___cm)] : no neck mass was observed [Jugular Venous Distention Increased] : there was no jugular-venous distention [Thyroid Diffuse Enlargement] : the thyroid was not enlarged [Thyroid Nodule] : there were no palpable thyroid nodules [Auscultation Breath Sounds / Voice Sounds] : lungs were clear to auscultation bilaterally [Heart Rate And Rhythm] : heart rate was normal and rhythm regular [Heart Sounds] : normal S1 and S2 [Heart Sounds Gallop] : no gallops [Murmurs] : no murmurs [Heart Sounds Pericardial Friction Rub] : no pericardial rub [Bowel Sounds] : normal bowel sounds [Abdomen Soft] : soft [] : no hepato-splenomegaly [Abdomen Mass (___ Cm)] : no abdominal mass palpated [Epigastric] : in the epigastric area [Normal Sphincter Tone] : normal sphincter tone [No Rectal Mass] : no rectal mass [No CVA Tenderness] : no ~M costovertebral angle tenderness [Abnormal Walk] : normal gait [Skin Turgor] : normal skin turgor [Musculoskeletal - Swelling] : no joint swelling seen [Skin Color & Pigmentation] : normal skin color and pigmentation [Oriented To Time, Place, And Person] : oriented to person, place, and time [Periumbilical] : not in the periumbilical area [RUQ] : not in the in the right upper quadrant [Suprapubic] : not in the suprapubic area [RLQ] : not in the right lower quadrant [LUQ] : not in the left upper quadrant [LLQ] : not in the left lower quadrant [Internal Hemorrhoid] : no internal hemorrhoids [External Hemorrhoid] : no external hemorrhoids [Occult Blood Positive] : stool was negative for occult blood [FreeTextEntry1] : Hemoccult-negative stool

## 2020-01-17 NOTE — HISTORY OF PRESENT ILLNESS
[None] : had no significant interval events [Heartburn] : denies heartburn [Nausea] : denies nausea [Vomiting] : denies vomiting [Diarrhea] : denies diarrhea [Constipation] : denies constipation [Yellow Skin Or Eyes (Jaundice)] : denies jaundice [Abdominal Swelling] : denies abdominal swelling [Rectal Pain] : denies rectal pain [Abdominal Pain] : abdominal pain [Wt Gain ___ Lbs] : no recent weight gain [Wt Loss ___ Lbs] : no recent weight loss [GERD] : no gastroesophageal reflux disease [Hiatus Hernia] : no hiatus hernia [Peptic Ulcer Disease] : no peptic ulcer disease [Cholelithiasis] : no cholelithiasis [Pancreatitis] : no pancreatitis [Kidney Stone] : no kidney stone [Inflammatory Bowel Disease] : no inflammatory bowel disease [Irritable Bowel Syndrome] : no irritable bowel syndrome [Alcohol Abuse] : no alcohol abuse [Diverticulitis] : no diverticulitis [Malignancy] : no malignancy [Abdominal Surgery] : no abdominal surgery [Appendectomy] : no appendectomy [Cholecystectomy] : no cholecystectomy [de-identified] : January 9, 2020 [de-identified] : EGD with biopsy [de-identified] : Patient presents today for followup evaluation of chronic postprandial dyspepsia status post a recent EGD with biopsy January 9, 2020 which showed H. pylori gastritis which has not yet been treated. He is also status post negative colonoscopy except for internal hemorrhoids done in December 2019. [de-identified] : postprandial dyspepsia

## 2020-01-17 NOTE — REVIEW OF SYSTEMS
[Negative] : Heme/Lymph [As Noted in HPI] : as noted in HPI [Abdominal Pain] : abdominal pain [Constipation] : no constipation [Vomiting] : no vomiting [Diarrhea] : no diarrhea [Heartburn] : no heartburn [Melena] : no melena [FreeTextEntry7] : postprandial dyspepsia

## 2020-01-20 ENCOUNTER — RX CHANGE (OUTPATIENT)
Age: 60
End: 2020-01-20

## 2020-01-28 ENCOUNTER — RX RENEWAL (OUTPATIENT)
Age: 60
End: 2020-01-28

## 2020-01-31 ENCOUNTER — RX RENEWAL (OUTPATIENT)
Age: 60
End: 2020-01-31

## 2020-02-18 ENCOUNTER — RX RENEWAL (OUTPATIENT)
Age: 60
End: 2020-02-18

## 2020-02-28 LAB — UREA BREATH TEST QL: POSITIVE

## 2020-03-11 ENCOUNTER — RX RENEWAL (OUTPATIENT)
Age: 60
End: 2020-03-11

## 2020-03-12 ENCOUNTER — APPOINTMENT (OUTPATIENT)
Dept: GASTROENTEROLOGY | Facility: CLINIC | Age: 60
End: 2020-03-12
Payer: MEDICAID

## 2020-03-12 VITALS
DIASTOLIC BLOOD PRESSURE: 91 MMHG | WEIGHT: 204 LBS | BODY MASS INDEX: 34.83 KG/M2 | HEART RATE: 85 BPM | HEIGHT: 64 IN | SYSTOLIC BLOOD PRESSURE: 179 MMHG

## 2020-03-12 PROCEDURE — 99214 OFFICE O/P EST MOD 30 MIN: CPT

## 2020-03-12 RX ORDER — CLARITHROMYCIN 500 MG/1
500 TABLET, FILM COATED ORAL
Qty: 28 | Refills: 0 | Status: DISCONTINUED | COMMUNITY
Start: 2020-01-17 | End: 2020-03-12

## 2020-03-12 RX ORDER — AMOXICILLIN 500 MG/1
500 CAPSULE ORAL TWICE DAILY
Qty: 56 | Refills: 0 | Status: DISCONTINUED | COMMUNITY
Start: 2020-01-17 | End: 2020-03-12

## 2020-03-12 NOTE — PHYSICAL EXAM
[General Appearance - Alert] : alert [General Appearance - In No Acute Distress] : in no acute distress [General Appearance - Well Nourished] : well nourished [General Appearance - Well Developed] : well developed [General Appearance - Well-Appearing] : healthy appearing [Sclera] : the sclera and conjunctiva were normal [PERRL With Normal Accommodation] : pupils were equal in size, round, and reactive to light [Extraocular Movements] : extraocular movements were intact [Outer Ear] : the ears and nose were normal in appearance [Oropharynx] : the oropharynx was normal [Neck Appearance] : the appearance of the neck was normal [Neck Cervical Mass (___cm)] : no neck mass was observed [Jugular Venous Distention Increased] : there was no jugular-venous distention [Thyroid Diffuse Enlargement] : the thyroid was not enlarged [Thyroid Nodule] : there were no palpable thyroid nodules [Auscultation Breath Sounds / Voice Sounds] : lungs were clear to auscultation bilaterally [Heart Rate And Rhythm] : heart rate was normal and rhythm regular [Heart Sounds] : normal S1 and S2 [Heart Sounds Gallop] : no gallops [Murmurs] : no murmurs [Heart Sounds Pericardial Friction Rub] : no pericardial rub [Bowel Sounds] : normal bowel sounds [Abdomen Soft] : soft [] : no hepato-splenomegaly [Abdomen Mass (___ Cm)] : no abdominal mass palpated [Epigastric] : in the epigastric area [Normal Sphincter Tone] : normal sphincter tone [No Rectal Mass] : no rectal mass [No CVA Tenderness] : no ~M costovertebral angle tenderness [Abnormal Walk] : normal gait [Musculoskeletal - Swelling] : no joint swelling seen [Skin Color & Pigmentation] : normal skin color and pigmentation [Skin Turgor] : normal skin turgor [Oriented To Time, Place, And Person] : oriented to person, place, and time [Periumbilical] : not in the periumbilical area [Suprapubic] : not in the suprapubic area [RUQ] : not in the in the right upper quadrant [RLQ] : not in the right lower quadrant [LUQ] : not in the left upper quadrant [LLQ] : not in the left lower quadrant [Internal Hemorrhoid] : no internal hemorrhoids [External Hemorrhoid] : no external hemorrhoids [Occult Blood Positive] : stool was negative for occult blood [FreeTextEntry1] : Hemoccult-negative stool

## 2020-03-12 NOTE — ASSESSMENT
[FreeTextEntry1] : Impression: Persistent postprandial dyspepsia secondary to H. pylori infection. Previous treatment has been ineffective.\par \par Recommendations: Patient is to be retreated with omeprazole 20 mg twice daily and Levaquin 500 mg once daily and metronidazole 500 mg twice daily for 2 weeks for alternative treatment for his H. pylori infection. He is to go for repeat H. pylori urea breath test 2 weeks after the above therapy has been completed and to return here in 6 weeks for followup evaluation. He appeared to understand all of the above instructions, information, and management plan.

## 2020-03-12 NOTE — HISTORY OF PRESENT ILLNESS
[Heartburn] : denies heartburn [Nausea] : denies nausea [Vomiting] : denies vomiting [Diarrhea] : denies diarrhea [Constipation] : denies constipation [Yellow Skin Or Eyes (Jaundice)] : denies jaundice [Abdominal Pain] : stable abdominal pain [Abdominal Swelling] : denies abdominal swelling [Rectal Pain] : denies rectal pain [Wt Gain ___ Lbs] : no recent weight gain [Wt Loss ___ Lbs] : no recent weight loss [GERD] : no gastroesophageal reflux disease [Hiatus Hernia] : no hiatus hernia [Peptic Ulcer Disease] : no peptic ulcer disease [Pancreatitis] : no pancreatitis [Cholelithiasis] : no cholelithiasis [Kidney Stone] : no kidney stone [Inflammatory Bowel Disease] : no inflammatory bowel disease [Irritable Bowel Syndrome] : no irritable bowel syndrome [Diverticulitis] : no diverticulitis [Alcohol Abuse] : no alcohol abuse [Malignancy] : no malignancy [Abdominal Surgery] : no abdominal surgery [Appendectomy] : no appendectomy [Cholecystectomy] : no cholecystectomy [de-identified] : January 2020 [de-identified] : prescribing triple therapy for H. pylori [de-identified] : H. pylori urea breath test [de-identified] : Patient presents today for followup evaluation of H. pylori  infection with associated nonulcer dyspepsia. When he was last here in January triple therapy with omeprazole amoxicillin and clarithromycin was prescribed for 2 weeks for H. pylori eradication and he subsequently had an H. pylori urea breath test 2 weeks after completion of this therapy which remain positive indicating persistent infection. He was informed of this when he returned for his followup visit today. He continues to have intermittent postprandial dyspepsia. [de-identified] : persistent postprandial dyspepsia

## 2020-04-17 LAB — UREA BREATH TEST QL: NEGATIVE

## 2020-04-23 ENCOUNTER — APPOINTMENT (OUTPATIENT)
Dept: GASTROENTEROLOGY | Facility: CLINIC | Age: 60
End: 2020-04-23

## 2020-05-01 ENCOUNTER — APPOINTMENT (OUTPATIENT)
Dept: GASTROENTEROLOGY | Facility: CLINIC | Age: 60
End: 2020-05-01
Payer: MEDICAID

## 2020-05-01 DIAGNOSIS — K29.00 ACUTE GASTRITIS W/OUT BLEEDING: ICD-10-CM

## 2020-05-01 DIAGNOSIS — A04.8 OTHER SPECIFIED BACTERIAL INTESTINAL INFECTIONS: ICD-10-CM

## 2020-05-01 PROCEDURE — 99443: CPT

## 2020-05-01 NOTE — ASSESSMENT
[FreeTextEntry1] : Impression: H. pylori gastritis successfully treated and eradicated with a negative H. pylori urea breath test post treatment. Symptomatic internal hemorrhoids to do respond to hydrocortisone 25 mg suppositories.\par \par Recommendations: A new prescription for hydrocortisone 25 mg suppositories with refills was sent to his pharmacy today for topical treatment of symptomatic internal hemorrhoids. A high fiber diet for routine colon health and bowel regularity was recommended and explained to the patient today as well. Repeat colonoscopy in 10 years for continued colon cancer screening. He is to return to see me here in the office in roughly 6 months for followup evaluation or sooner if necessary and appeared to understand all of the above instructions, information, and management plan.

## 2020-05-01 NOTE — HISTORY OF PRESENT ILLNESS
[Verbal consent obtained from patient] : the patient, [unfilled] [Heartburn] : denies heartburn [Nausea] : denies nausea [Vomiting] : denies vomiting [Diarrhea] : denies diarrhea [Yellow Skin Or Eyes (Jaundice)] : denies jaundice [Constipation] : denies constipation [Abdominal Pain] : denies abdominal pain [Abdominal Swelling] : denies abdominal swelling [Rectal Pain] : denies rectal pain [Wt Gain ___ Lbs] : no recent weight gain [GERD] : no gastroesophageal reflux disease [Hiatus Hernia] : no hiatus hernia [Wt Loss ___ Lbs] : no recent weight loss [Peptic Ulcer Disease] : no peptic ulcer disease [Pancreatitis] : no pancreatitis [Cholelithiasis] : no cholelithiasis [Kidney Stone] : no kidney stone [Inflammatory Bowel Disease] : no inflammatory bowel disease [Irritable Bowel Syndrome] : no irritable bowel syndrome [Diverticulitis] : no diverticulitis [Alcohol Abuse] : no alcohol abuse [Malignancy] : no malignancy [Abdominal Surgery] : no abdominal surgery [de-identified] : march 12, 2020 [Appendectomy] : no appendectomy [Cholecystectomy] : no cholecystectomy [de-identified] : ordering H. pylori urea breath test [de-identified] : H. pylori urea breath test [de-identified] : Patient presents and is verbal consent for telephonic followup visit for H. pylori gastritis and symptomatic internal hemorrhoids. He is status post an upper endoscopy with biopsy done in January 2020 which showed H. pyloric gastritis treated with triple therapy with a negative posttreatment H. pylori urea breath test done April 16, 2020. He is also status post a negative colonoscopy except for internal hemorrhoids done in December of 2019 and states that he does get intermittent low-volume hematochezia secondary to these internal hemorrhoids which are usually treated successfully with hydrocortisone 25 mg suppositories. He was informed of the above negative H. pylori urea breath test and states that his upper abdominal symptoms have resolved with H. pylori eradication. [de-identified] : low-volume hematochezia

## 2020-05-01 NOTE — REVIEW OF SYSTEMS
[As Noted in HPI] : as noted in HPI [Negative] : Heme/Lymph [Abdominal Pain] : no abdominal pain [Vomiting] : no vomiting [Constipation] : no constipation [Diarrhea] : no diarrhea [Heartburn] : no heartburn [Melena] : no melena [FreeTextEntry7] : low-volume hematochezia

## 2020-07-14 ENCOUNTER — RX RENEWAL (OUTPATIENT)
Age: 60
End: 2020-07-14

## 2020-08-05 ENCOUNTER — RX RENEWAL (OUTPATIENT)
Age: 60
End: 2020-08-05

## 2020-09-24 ENCOUNTER — NON-APPOINTMENT (OUTPATIENT)
Age: 60
End: 2020-09-24

## 2020-09-24 ENCOUNTER — APPOINTMENT (OUTPATIENT)
Dept: CARDIOLOGY | Facility: CLINIC | Age: 60
End: 2020-09-24
Payer: MEDICAID

## 2020-09-24 VITALS
HEIGHT: 64 IN | BODY MASS INDEX: 34.15 KG/M2 | SYSTOLIC BLOOD PRESSURE: 144 MMHG | RESPIRATION RATE: 12 BRPM | WEIGHT: 200 LBS | TEMPERATURE: 98 F | HEART RATE: 73 BPM | DIASTOLIC BLOOD PRESSURE: 76 MMHG

## 2020-09-24 PROCEDURE — 99214 OFFICE O/P EST MOD 30 MIN: CPT

## 2020-09-24 PROCEDURE — 93000 ELECTROCARDIOGRAM COMPLETE: CPT

## 2020-09-24 RX ORDER — METRONIDAZOLE 500 MG/1
500 TABLET ORAL
Qty: 28 | Refills: 0 | Status: DISCONTINUED | COMMUNITY
Start: 2020-03-12 | End: 2020-09-24

## 2020-09-24 RX ORDER — OMEPRAZOLE 40 MG/1
40 CAPSULE, DELAYED RELEASE ORAL
Qty: 30 | Refills: 5 | Status: DISCONTINUED | COMMUNITY
Start: 2020-01-09 | End: 2020-09-24

## 2020-09-24 RX ORDER — LEVOFLOXACIN 500 MG/1
500 TABLET, FILM COATED ORAL DAILY
Qty: 14 | Refills: 0 | Status: DISCONTINUED | COMMUNITY
Start: 2020-03-12 | End: 2020-09-24

## 2020-09-24 NOTE — ASSESSMENT
[FreeTextEntry1] : ECG: SR, LAFB  (unchanged from prior) \par \par ECHO 4/2019:\par 1. Borderline increased LV wall thickness.\par 2. Normal left ventricular internal cavity size.\par 3. Left ventricular ejection fraction, by visual estimation, is 60 to 65%.\par 4. Normal right ventricular size and systolic function.\par 5. Mildly dilated left atrium.\par 6. The right atrium is normal in size and structure.\par 7. Normal aortic valve, without any evidence of aortic stenosis or insufficiency.\par 8. Mild mitral valve regurgitation.\par 9. Mild tricuspid regurgitation.\par 10. Interatrial and interventricular septa appear intact\par 11. A prominent moderator band is noted in the RV.\par 12. There is no evidence of pericardial effusion.\par 13. Recommend clinical correlation with the above findings.\par \par CAROTID 4/2019:\par 1. All arteries were clearly visualized.\par 2. Normal LICA.\par 3. Normal JESUS ALBERTO.\par 4. Normal left ECA.\par 5. There is <50% stenosis of the right ECA.\par 6. There is antegrade flow in the right and left vertebral arteries.\par 7. Recommend clinical correlation with the above findings.\par \par EX NUCLEAR STRESS TEST 4/2019:\par 1. Normal Sestamibi myocardial perfusion SPECT imaging with artifact as noted above.\par 2. Left ventricular function was hyperdynamic with an EF of 72%.\par 3. The EKG was negative for ischemia.\par 4. The patient developed leg fatigue during the stress exam. The symptoms resolved with rest.\par 5. The test was terminated due to leg fatigue.\par 6. The blood pressure was hypertensive baseline with normal response to exercise.\par 7. The patient developed no dysrhythmias during exercise.\par 8. The patient's functional capacity was average.\par 9. The Duke Treadmill Score was 9, consistent with low risk.\par 10. When compared to prior study dated 2014, there has been no significant interval change.\par 11. Recommend clinical correlation with the above findings.\par \par  HDL 44   (2/2019)\par A1C 6.3, Fasting glucose 110 (2/2019)

## 2020-09-24 NOTE — HISTORY OF PRESENT ILLNESS
[FreeTextEntry1] : Patient is a 61yo M with HLD, HTN, former smoker, obesity here for cardiac follow up. HAd negative work up for dizziness/fatigue/tiredness earlier this year. Doing fairly well since. No CP/SOB. Patient denies PND/orthopnea/edema/palpitations/syncope/claudication. 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 \par \par ROS: GI and  negative

## 2020-09-24 NOTE — DISCUSSION/SUMMARY
[FreeTextEntry1] : Patient is a 61yo M with HLD, obesity, HTN, IFG, fatty liver, former smoker here for cardiac follow up. Patient has been doing well without any chest pain or shortness of breath. DOes have daytime fatigue/tiredness, suspect FRANK. Snores a lot at night. BP remains above goal as well. \par \par 1.INcrease Valsartan to 160mg daily\par 2. Recommend 30 minutes moderate intensity aerobic activity 5 days per week \par 3. Recommend aggressive diet and lifestyle modifications \par 4. Follow up 3 months to re-eval BP\par 5. PMD follow up \par 6. REcommend sleep study again as suspect daytime fatigue/tiredness related and would benefit from CPAP. Pulm follow up after\par

## 2020-11-09 ENCOUNTER — RX RENEWAL (OUTPATIENT)
Age: 60
End: 2020-11-09

## 2020-12-03 ENCOUNTER — NON-APPOINTMENT (OUTPATIENT)
Age: 60
End: 2020-12-03

## 2021-03-25 ENCOUNTER — NON-APPOINTMENT (OUTPATIENT)
Age: 61
End: 2021-03-25

## 2021-03-25 ENCOUNTER — APPOINTMENT (OUTPATIENT)
Dept: CARDIOLOGY | Facility: CLINIC | Age: 61
End: 2021-03-25
Payer: MEDICAID

## 2021-03-25 VITALS
DIASTOLIC BLOOD PRESSURE: 76 MMHG | HEIGHT: 64 IN | BODY MASS INDEX: 34.15 KG/M2 | RESPIRATION RATE: 16 BRPM | OXYGEN SATURATION: 95 % | SYSTOLIC BLOOD PRESSURE: 187 MMHG | HEART RATE: 86 BPM | TEMPERATURE: 97.9 F | WEIGHT: 200 LBS

## 2021-03-25 DIAGNOSIS — F32.9 MAJOR DEPRESSIVE DISORDER, SINGLE EPISODE, UNSPECIFIED: ICD-10-CM

## 2021-03-25 PROCEDURE — 93000 ELECTROCARDIOGRAM COMPLETE: CPT

## 2021-03-25 PROCEDURE — 99072 ADDL SUPL MATRL&STAF TM PHE: CPT

## 2021-03-25 PROCEDURE — 99214 OFFICE O/P EST MOD 30 MIN: CPT

## 2021-03-25 RX ORDER — HYDROCORTISONE ACETATE 25 MG/1
25 SUPPOSITORY RECTAL
Qty: 12 | Refills: 5 | Status: DISCONTINUED | COMMUNITY
Start: 2019-12-12 | End: 2021-03-25

## 2021-03-25 RX ORDER — POLYETHYLENE GLYOCOL 3350, SODIUM CHLORIDE, SODIUM BICARBONATE AND POTASSIUM CHLORIDE 420; 11.2; 5.72; 1.48 G/4L; G/4L; G/4L; G/4L
420 POWDER, FOR SOLUTION NASOGASTRIC; ORAL
Qty: 1 | Refills: 0 | Status: DISCONTINUED | COMMUNITY
Start: 2019-11-15 | End: 2021-03-25

## 2021-03-25 RX ORDER — VALSARTAN 80 MG/1
80 TABLET, COATED ORAL DAILY
Refills: 0 | Status: DISCONTINUED | COMMUNITY
End: 2021-03-25

## 2021-03-25 RX ORDER — DULOXETINE HYDROCHLORIDE 60 MG/1
60 CAPSULE, DELAYED RELEASE PELLETS ORAL
Qty: 30 | Refills: 3 | Status: ACTIVE | COMMUNITY
Start: 1900-01-01 | End: 1900-01-01

## 2021-03-25 NOTE — PHYSICAL EXAM
[General Appearance - Well Developed] : well developed [General Appearance - Well Nourished] : well nourished [General Appearance - In No Acute Distress] : no acute distress [Normal Conjunctiva] : the conjunctiva exhibited no abnormalities [Normal Oropharynx] : normal oropharynx [Normal Jugular Venous V Waves Present] : normal jugular venous V waves present [] : no respiratory distress [Respiration, Rhythm And Depth] : normal respiratory rhythm and effort [Auscultation Breath Sounds / Voice Sounds] : lungs were clear to auscultation bilaterally [Heart Rate And Rhythm] : heart rate and rhythm were normal [Heart Sounds] : normal S1 and S2 [Murmurs] : no murmurs present [Edema] : no peripheral edema present [Bowel Sounds] : normal bowel sounds [Abdomen Soft] : soft [Abdomen Tenderness] : non-tender [Abnormal Walk] : normal gait [Nail Clubbing] : no clubbing of the fingernails [Cyanosis, Localized] : no localized cyanosis [Skin Color & Pigmentation] : normal skin color and pigmentation [Skin Turgor] : normal skin turgor [Oriented To Time, Place, And Person] : oriented to person, place, and time [Affect] : the affect was normal [FreeTextEntry1] : obese

## 2021-03-25 NOTE — DISCUSSION/SUMMARY
[FreeTextEntry1] : Patient is a 60yo M with HLD, obesity, HTN, IFG, fatty liver, former smoker here for cardiac follow up. \par -Needs to get back on meds, and contact us if runs out\par -ECG with mild abnormalities and ST changes, will arrange echo\par -Needs to lose weight\par -Planning on scheduling COVID vaccine as well\par \par 1. Restart BP meds, with higher dose Valsartan 160mg daily. Advised to keep list and contact us if needs refills or with questions\par 2. Check BMP/Mg in  a few weeks\par 3. Recommend aggressive diet and lifestyle modifications , counselled on weight loss\par 4. Follow up 3 months to re-eval BP. Echo prior due to ECG changes\par 5. Regular follow up with primary\par 6. Will need to readdress sleep study at follow up \par

## 2021-03-25 NOTE — HISTORY OF PRESENT ILLNESS
[FreeTextEntry1] : Patient is a 60yo M with HLD, HTN, former smoker, obesity here for cardiac follow up. HAd negative work up for dizziness/fatigue/tiredness in 2019. Doing fairly well since. No CP/SOB. Patient denies PND/orthopnea/edema/palpitations/syncope/claudication. Valsartan increased after last OV in 9/2020. Ran out of all meds however and never increased valsartan. NO new complaints. Still feels tired, no change. 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 \par \par ROS: GI and  negative

## 2021-05-06 ENCOUNTER — APPOINTMENT (OUTPATIENT)
Dept: CARDIOLOGY | Facility: CLINIC | Age: 61
End: 2021-05-06
Payer: MEDICAID

## 2021-05-06 VITALS — DIASTOLIC BLOOD PRESSURE: 66 MMHG | SYSTOLIC BLOOD PRESSURE: 134 MMHG

## 2021-05-06 PROCEDURE — 93306 TTE W/DOPPLER COMPLETE: CPT

## 2021-05-06 PROCEDURE — 99072 ADDL SUPL MATRL&STAF TM PHE: CPT

## 2021-06-17 ENCOUNTER — NON-APPOINTMENT (OUTPATIENT)
Age: 61
End: 2021-06-17

## 2021-06-17 ENCOUNTER — APPOINTMENT (OUTPATIENT)
Dept: CARDIOLOGY | Facility: CLINIC | Age: 61
End: 2021-06-17
Payer: MEDICAID

## 2021-06-17 VITALS
HEART RATE: 76 BPM | TEMPERATURE: 97.1 F | DIASTOLIC BLOOD PRESSURE: 86 MMHG | WEIGHT: 203 LBS | SYSTOLIC BLOOD PRESSURE: 160 MMHG | BODY MASS INDEX: 34.66 KG/M2 | HEIGHT: 64 IN | RESPIRATION RATE: 16 BRPM

## 2021-06-17 DIAGNOSIS — R53.83 OTHER FATIGUE: ICD-10-CM

## 2021-06-17 PROCEDURE — 93000 ELECTROCARDIOGRAM COMPLETE: CPT

## 2021-06-17 PROCEDURE — 99214 OFFICE O/P EST MOD 30 MIN: CPT

## 2021-06-17 RX ORDER — CHLORTHALIDONE 25 MG/1
25 TABLET ORAL
Qty: 90 | Refills: 3 | Status: DISCONTINUED | COMMUNITY
Start: 2019-05-02 | End: 2021-06-17

## 2021-06-17 RX ORDER — NAPROXEN 500 MG/1
500 TABLET ORAL
Refills: 0 | Status: DISCONTINUED | COMMUNITY
End: 2021-06-17

## 2021-06-17 RX ORDER — VALSARTAN 160 MG/1
160 TABLET, COATED ORAL DAILY
Qty: 90 | Refills: 2 | Status: DISCONTINUED | COMMUNITY
Start: 2019-07-18 | End: 2021-06-17

## 2021-06-17 NOTE — HISTORY OF PRESENT ILLNESS
[FreeTextEntry1] : Patient is a 62yo M with HLD, HTN, former smoker, obesity here for cardiac follow up. HAd negative work up for dizziness/fatigue/tiredness in 2019. Doing fairly well since. No CP/SOB. Patient denies PND/orthopnea/edema/palpitations/syncope/claudication. Restarted on Valsartan at last OV and echo done. NO new complaints. Still feels tired, no change. 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 \par \par ROS: GI and  negative

## 2021-06-17 NOTE — ASSESSMENT
[FreeTextEntry1] : ECG: SR,LAFB, NSST \par \par ECHO 5/2021:\par 1. Borderline increased septal wall thickness\par 2. Normal systolic and diastolic function, EF 65-70%\par 3. Normal RV/LA/RA \par 4. Trivial MR, mild TR,. RVSP 26mmHg\par \par ECHO 4/2019:\par 1. Borderline increased LV wall thickness.\par 2. Normal left ventricular internal cavity size.\par 3. Left ventricular ejection fraction, by visual estimation, is 60 to 65%.\par 4. Normal right ventricular size and systolic function.\par 5. Mildly dilated left atrium.\par 6. The right atrium is normal in size and structure.\par 7. Normal aortic valve, without any evidence of aortic stenosis or insufficiency.\par 8. Mild mitral valve regurgitation.\par 9. Mild tricuspid regurgitation.\par 10. Interatrial and interventricular septa appear intact\par 11. A prominent moderator band is noted in the RV.\par 12. There is no evidence of pericardial effusion.\par 13. Recommend clinical correlation with the above findings.\par \par CAROTID 4/2019:\par 1. All arteries were clearly visualized.\par 2. Normal LICA.\par 3. Normal JESUS ALBERTO.\par 4. Normal left ECA.\par 5. There is <50% stenosis of the right ECA.\par 6. There is antegrade flow in the right and left vertebral arteries.\par 7. Recommend clinical correlation with the above findings.\par \par EX NUCLEAR STRESS TEST 4/2019:\par 1. Normal Sestamibi myocardial perfusion SPECT imaging with artifact as noted above.\par 2. Left ventricular function was hyperdynamic with an EF of 72%.\par 3. The EKG was negative for ischemia.\par 4. The patient developed leg fatigue during the stress exam. The symptoms resolved with rest.\par 5. The test was terminated due to leg fatigue.\par 6. The blood pressure was hypertensive baseline with normal response to exercise.\par 7. The patient developed no dysrhythmias during exercise.\par 8. The patient's functional capacity was average.\par 9. The Duke Treadmill Score was 9, consistent with low risk.\par 10. When compared to prior study dated 2014, there has been no significant interval change.\par 11. Recommend clinical correlation with the above findings.\par \par  HDL 44   (2/2019)\par A1C 6.3, Fasting glucose 110 (2/2019)

## 2021-08-14 ENCOUNTER — RX RENEWAL (OUTPATIENT)
Age: 61
End: 2021-08-14

## 2021-09-30 ENCOUNTER — NON-APPOINTMENT (OUTPATIENT)
Age: 61
End: 2021-09-30

## 2021-09-30 ENCOUNTER — APPOINTMENT (OUTPATIENT)
Dept: CARDIOLOGY | Facility: CLINIC | Age: 61
End: 2021-09-30
Payer: MEDICAID

## 2021-09-30 VITALS
BODY MASS INDEX: 35 KG/M2 | RESPIRATION RATE: 16 BRPM | HEIGHT: 64 IN | DIASTOLIC BLOOD PRESSURE: 75 MMHG | HEART RATE: 64 BPM | OXYGEN SATURATION: 95 % | WEIGHT: 205 LBS | SYSTOLIC BLOOD PRESSURE: 125 MMHG

## 2021-09-30 PROCEDURE — 93000 ELECTROCARDIOGRAM COMPLETE: CPT

## 2021-09-30 PROCEDURE — 99214 OFFICE O/P EST MOD 30 MIN: CPT

## 2021-09-30 NOTE — HISTORY OF PRESENT ILLNESS
[FreeTextEntry1] : Patient is a 62yo M with HLD, HTN, former smoker, obesity here for cardiac follow up.  No CP/SOB. Patient denies PND/orthopnea/edema/palpitations/syncope/claudication. NO new complaints. Energy level better and feeling well.  HCTZ added last OV and recommended sleep study. Cant do regular exercise due to ankle issue, states needs surgery. 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 \par \par ROS: GI and  negative

## 2021-09-30 NOTE — ASSESSMENT
Take antibiotic as prescribed until finished.  Continue applying warm compresses to the side of the face.  Take Tylenol/ ibuprofen as needed for the pain.    Schedule appointment with your doctor as needed in 5 to 7 days for reevaluation.    Return to emergency department for worsening or concerning symptoms.   [FreeTextEntry1] : ECG: SR. leftward axis,  NSST \par \par ECHO 5/2021:\par 1. Borderline increased septal wall thickness\par 2. Normal systolic and diastolic function, EF 65-70%\par 3. Normal RV/LA/RA \par 4. Trivial MR, mild TR,. RVSP 26mmHg\par \par \par CAROTID 4/2019:\par 1. All arteries were clearly visualized.\par 2. Normal LICA.\par 3. Normal JESUS ALBERTO.\par 4. Normal left ECA.\par 5. There is <50% stenosis of the right ECA.\par 6. There is antegrade flow in the right and left vertebral arteries.\par 7. Recommend clinical correlation with the above findings.\par \par EX NUCLEAR STRESS TEST 4/2019:\par 1. Normal Sestamibi myocardial perfusion SPECT imaging with artifact as noted above.\par 2. Left ventricular function was hyperdynamic with an EF of 72%.\par 3. The EKG was negative for ischemia.\par 4. The patient developed leg fatigue during the stress exam. The symptoms resolved with rest.\par 5. The test was terminated due to leg fatigue.\par 6. The blood pressure was hypertensive baseline with normal response to exercise.\par 7. The patient developed no dysrhythmias during exercise.\par 8. The patient's functional capacity was average.\par 9. The Duke Treadmill Score was 9, consistent with low risk.\par 10. When compared to prior study dated 2014, there has been no significant interval change.\par 11. Recommend clinical correlation with the above findings.\par \par  HDL 44   (2/2019)\par A1C 6.3, Fasting glucose 110 (2/2019) Skin normal color for race, warm, dry and intact. No evidence of rash.

## 2021-09-30 NOTE — DISCUSSION/SUMMARY
[FreeTextEntry1] : Patient is a 62yo M with HLD, obesity, HTN, IFG, fatty liver, former smoker here for cardiac follow up. \par -Echo with borderline increased septal wall, otherwise unremarkable 5/2021 \par -Needs to lose weight still\par -Suspect FRANK complicating HTN, never had sleep study but BP much better now\par \par 1. Continue current antihypertensives, blood pressure is well controlled \par 2.Sleep study, has known FRANK from years ago and needs to be treated/evaluated. Will refer to [facility] \par 3. Recommend aggressive diet and lifestyle modifications , counselled on weight loss\par 4. Follow up 6 months\par 5. Regular follow up with primary\par \par

## 2022-03-24 ENCOUNTER — APPOINTMENT (OUTPATIENT)
Dept: CARDIOLOGY | Facility: CLINIC | Age: 62
End: 2022-03-24
Payer: MEDICAID

## 2022-03-24 ENCOUNTER — NON-APPOINTMENT (OUTPATIENT)
Age: 62
End: 2022-03-24

## 2022-03-24 VITALS
BODY MASS INDEX: 33.97 KG/M2 | SYSTOLIC BLOOD PRESSURE: 143 MMHG | RESPIRATION RATE: 16 BRPM | OXYGEN SATURATION: 95 % | WEIGHT: 199 LBS | HEART RATE: 93 BPM | DIASTOLIC BLOOD PRESSURE: 77 MMHG | HEIGHT: 64 IN

## 2022-03-24 DIAGNOSIS — R73.01 IMPAIRED FASTING GLUCOSE: ICD-10-CM

## 2022-03-24 PROCEDURE — 99214 OFFICE O/P EST MOD 30 MIN: CPT

## 2022-03-24 PROCEDURE — 93000 ELECTROCARDIOGRAM COMPLETE: CPT

## 2022-03-24 RX ORDER — METOPROLOL SUCCINATE 100 MG/1
100 TABLET, EXTENDED RELEASE ORAL
Qty: 90 | Refills: 3 | Status: ACTIVE | COMMUNITY
Start: 2019-05-03 | End: 1900-01-01

## 2022-03-24 RX ORDER — FAMOTIDINE 40 MG/1
40 TABLET, FILM COATED ORAL TWICE DAILY
Qty: 60 | Refills: 5 | Status: DISCONTINUED | COMMUNITY
Start: 2020-03-12 | End: 2022-03-24

## 2022-03-24 RX ORDER — ATORVASTATIN CALCIUM 40 MG/1
40 TABLET, FILM COATED ORAL
Qty: 90 | Refills: 3 | Status: ACTIVE | COMMUNITY
Start: 2019-07-18 | End: 1900-01-01

## 2022-03-24 RX ORDER — OMEPRAZOLE 20 MG/1
20 CAPSULE, DELAYED RELEASE ORAL
Qty: 28 | Refills: 0 | Status: DISCONTINUED | COMMUNITY
Start: 2020-01-17 | End: 2022-03-24

## 2022-03-24 NOTE — PHYSICAL EXAM
[General Appearance - Well Nourished] : well nourished [General Appearance - Well Developed] : well developed [General Appearance - In No Acute Distress] : no acute distress [Normal Conjunctiva] : the conjunctiva exhibited no abnormalities [Normal Oropharynx] : normal oropharynx [Normal Jugular Venous V Waves Present] : normal jugular venous V waves present [] : no respiratory distress [Respiration, Rhythm And Depth] : normal respiratory rhythm and effort [Auscultation Breath Sounds / Voice Sounds] : lungs were clear to auscultation bilaterally [Heart Rate And Rhythm] : heart rate and rhythm were normal [Heart Sounds] : normal S1 and S2 [Murmurs] : no murmurs present [Edema] : no peripheral edema present [Bowel Sounds] : normal bowel sounds [Abdomen Tenderness] : non-tender [Abdomen Soft] : soft [Abnormal Walk] : normal gait [Nail Clubbing] : no clubbing of the fingernails [Cyanosis, Localized] : no localized cyanosis [Skin Color & Pigmentation] : normal skin color and pigmentation [Skin Turgor] : normal skin turgor [Affect] : the affect was normal [Oriented To Time, Place, And Person] : oriented to person, place, and time [FreeTextEntry1] : obese

## 2022-03-24 NOTE — DISCUSSION/SUMMARY
[FreeTextEntry1] : Patient is a 63yo M with HLD, obesity, HTN, IFG, fatty liver, former smoker here for cardiac follow up. \par -Echo with borderline increased septal wall, otherwise unremarkable 5/2021 \par -Needs to lose weight still\par -Suspect FRANK complicating HTN, never had sleep study but BP much better now\par \par 1. Continue current antihypertensives, blood pressure is well controlled . High today as missed his meds this morning\par 2.Still recommend sleep study, has known FRANK from years ago and needs to be treated/evaluated. Will refer again to [facility] as still not done yet and willing to do now. \par 3. Recommend aggressive diet and lifestyle modifications , counselled on weight loss\par 4. Regular follow up with primary\par 5. Follow up 6 months\par

## 2022-03-24 NOTE — HISTORY OF PRESENT ILLNESS
[FreeTextEntry1] : Patient is a 63yo M with HLD, HTN, former smoker, obesity here for cardiac follow up.  No CP/SOB. Patient denies PND/orthopnea/edema/palpitations/syncope/claudication. NO new complaints. Energy level better and feeling well.  HCTZ added last OV and recommended sleep study. Had COVID last fall and sick at home 10-11 days, had trouble breathing and exhausted. Having some low back pain, worse when sits and tries to get up. More sednetary lately due to back pain. 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 \par \par ROS: GI and  negative

## 2022-03-24 NOTE — ASSESSMENT
[FreeTextEntry1] : ECG: SR, LAFB \par \par ECHO 5/2021:\par 1. Borderline increased septal wall thickness\par 2. Normal systolic and diastolic function, EF 65-70%\par 3. Normal RV/LA/RA \par 4. Trivial MR, mild TR,. RVSP 26mmHg\par \par \par CAROTID 4/2019:\par 1. All arteries were clearly visualized.\par 2. Normal ICAs\par 3. There is <50% stenosis of the right ECA.\par 4. There is antegrade flow in the right and left vertebral arteries.\par \par \par EX NUCLEAR STRESS TEST 4/2019:\par 1. Normal Sestamibi myocardial perfusion SPECT imaging with artifact as noted above.\par 2. Left ventricular function was hyperdynamic with an EF of 72%.\par 3. The EKG was negative for ischemia.\par 4. The blood pressure was hypertensive baseline with normal response to exercise.\par 5. The patient's functional capacity was average.\par 6. The Duke Treadmill Score was 9, consistent with low risk.\par \par  HDL 44   (2/2019)\par A1C 6.3, Fasting glucose 110 (2/2019)

## 2022-04-07 ENCOUNTER — NON-APPOINTMENT (OUTPATIENT)
Age: 62
End: 2022-04-07

## 2022-04-12 ENCOUNTER — APPOINTMENT (OUTPATIENT)
Dept: GASTROENTEROLOGY | Facility: CLINIC | Age: 62
End: 2022-04-12
Payer: MEDICAID

## 2022-04-12 VITALS
RESPIRATION RATE: 14 BRPM | TEMPERATURE: 97 F | SYSTOLIC BLOOD PRESSURE: 140 MMHG | DIASTOLIC BLOOD PRESSURE: 80 MMHG | WEIGHT: 200 LBS | BODY MASS INDEX: 34.15 KG/M2 | HEIGHT: 64 IN | OXYGEN SATURATION: 98 % | HEART RATE: 74 BPM

## 2022-04-12 DIAGNOSIS — K64.8 OTHER HEMORRHOIDS: ICD-10-CM

## 2022-04-12 DIAGNOSIS — K62.5 HEMORRHAGE OF ANUS AND RECTUM: ICD-10-CM

## 2022-04-12 DIAGNOSIS — R10.13 EPIGASTRIC PAIN: ICD-10-CM

## 2022-04-12 PROCEDURE — 99215 OFFICE O/P EST HI 40 MIN: CPT

## 2022-04-12 PROCEDURE — 82272 OCCULT BLD FECES 1-3 TESTS: CPT

## 2022-04-12 NOTE — PHYSICAL EXAM
[Sclera] : the sclera and conjunctiva were normal [PERRL With Normal Accommodation] : pupils were equal in size, round, and reactive to light [Extraocular Movements] : extraocular movements were intact [Outer Ear] : the ears and nose were normal in appearance [Oropharynx] : the oropharynx was normal [Neck Appearance] : the appearance of the neck was normal [Neck Cervical Mass (___cm)] : no neck mass was observed [Jugular Venous Distention Increased] : there was no jugular-venous distention [Thyroid Diffuse Enlargement] : the thyroid was not enlarged [Thyroid Nodule] : there were no palpable thyroid nodules [Auscultation Breath Sounds / Voice Sounds] : lungs were clear to auscultation bilaterally [Heart Rate And Rhythm] : heart rate was normal and rhythm regular [Heart Sounds] : normal S1 and S2 [Heart Sounds Gallop] : no gallops [Murmurs] : no murmurs [Heart Sounds Pericardial Friction Rub] : no pericardial rub [Bowel Sounds] : normal bowel sounds [Abdomen Soft] : soft [] : no hepato-splenomegaly [Abdomen Mass (___ Cm)] : no abdominal mass palpated [Epigastric] : in the epigastric area [Normal Sphincter Tone] : normal sphincter tone [No Rectal Mass] : no rectal mass [No CVA Tenderness] : no ~M costovertebral angle tenderness [Abnormal Walk] : normal gait [Musculoskeletal - Swelling] : no joint swelling seen [Skin Color & Pigmentation] : normal skin color and pigmentation [Skin Turgor] : normal skin turgor [Oriented To Time, Place, And Person] : oriented to person, place, and time [Periumbilical] : not in the periumbilical area [Suprapubic] : not in the suprapubic area [RUQ] : not in the in the right upper quadrant [RLQ] : not in the right lower quadrant [LUQ] : not in the left upper quadrant [LLQ] : not in the left lower quadrant [Internal Hemorrhoid] : no internal hemorrhoids [External Hemorrhoid] : no external hemorrhoids [Occult Blood Positive] : stool was negative for occult blood [FreeTextEntry1] : Hemoccult-positive stool with perianal inflammation

## 2022-04-12 NOTE — ASSESSMENT
[FreeTextEntry1] : Impression: Recurrent dyspepsia and rectal bleeding rule out ulcer disease and/or gastritis and/or esophagitis and/or recurrent H. pylori infection and/or colorectal neoplasm and/or recurrent internal hemorrhoidal bleeding.\par \par Recommendations: Hydrocortisone 2.5% cream was prescribed for topical treatment of his perianal inflammation pending repeat upper endoscopy and colonoscopy which were advised for further evaluation of the above.  The risk versus benefits of repeat colonoscopy and upper endoscopy and intravenous sedation, and alternative testing such as virtual colonoscopy and upper GI series, were individually explained to the patient today who appeared to understand all of the above and was agreeable to proceeding with both procedures.  His ASA classification is 2.  He will be prepped with [facility] for the colonoscopy and is medically optimized for the proposed colonoscopy and appeared to understand all of the above instructions, information, and management plan.

## 2022-04-12 NOTE — HISTORY OF PRESENT ILLNESS
[None] : had no significant interval events [Heartburn] : denies heartburn [Nausea] : denies nausea [Vomiting] : denies vomiting [Diarrhea] : denies diarrhea [Constipation] : denies constipation [Yellow Skin Or Eyes (Jaundice)] : denies jaundice [Abdominal Swelling] : denies abdominal swelling [Abdominal Pain] : abdominal pain [Rectal Pain] : rectal pain [Wt Gain ___ Lbs] : no recent weight gain [Wt Loss ___ Lbs] : no recent weight loss [GERD] : no gastroesophageal reflux disease [Hiatus Hernia] : no hiatus hernia [Peptic Ulcer Disease] : no peptic ulcer disease [Pancreatitis] : no pancreatitis [Cholelithiasis] : no cholelithiasis [Kidney Stone] : no kidney stone [Inflammatory Bowel Disease] : no inflammatory bowel disease [Irritable Bowel Syndrome] : no irritable bowel syndrome [Diverticulitis] : no diverticulitis [Alcohol Abuse] : no alcohol abuse [Malignancy] : no malignancy [Abdominal Surgery] : no abdominal surgery [Appendectomy] : no appendectomy [Cholecystectomy] : no cholecystectomy [de-identified] : May 2020 [de-identified] : Patient presents for follow-up evaluation of recurrent postprandial dyspepsia and rectal bleeding status post upper endoscopy with biopsy in 2020 which showed H. pylori gastritis which was treated and successfully eradicated with a negative H. pylori urea breath test in April 2020.  He also had a negative colonoscopy except for internal hemorrhoids done in 2019 for rectal bleeding.  He presents today with recurrent upper abdominal pain as well as intermittent low-volume to moderate volume hematochezia which he describes as painful. [de-identified] : Dyspepsia and rectal bleeding

## 2022-04-12 NOTE — REVIEW OF SYSTEMS
[As Noted in HPI] : as noted in HPI [Abdominal Pain] : abdominal pain [Negative] : Heme/Lymph [Vomiting] : no vomiting [Constipation] : no constipation [Diarrhea] : no diarrhea [Heartburn] : no heartburn [Melena] : no melena [FreeTextEntry7] : Dyspepsia and rectal bleeding

## 2022-05-05 ENCOUNTER — APPOINTMENT (OUTPATIENT)
Dept: GASTROENTEROLOGY | Facility: GI CENTER | Age: 62
End: 2022-05-05

## 2022-12-01 ENCOUNTER — NON-APPOINTMENT (OUTPATIENT)
Age: 62
End: 2022-12-01

## 2022-12-01 ENCOUNTER — APPOINTMENT (OUTPATIENT)
Dept: CARDIOLOGY | Facility: CLINIC | Age: 62
End: 2022-12-01

## 2022-12-01 VITALS
SYSTOLIC BLOOD PRESSURE: 190 MMHG | DIASTOLIC BLOOD PRESSURE: 86 MMHG | WEIGHT: 204 LBS | BODY MASS INDEX: 34.83 KG/M2 | HEART RATE: 88 BPM | RESPIRATION RATE: 14 BRPM | HEIGHT: 64 IN

## 2022-12-01 VITALS — DIASTOLIC BLOOD PRESSURE: 80 MMHG | SYSTOLIC BLOOD PRESSURE: 180 MMHG

## 2022-12-01 DIAGNOSIS — E66.9 OBESITY, UNSPECIFIED: ICD-10-CM

## 2022-12-01 DIAGNOSIS — Z87.891 PERSONAL HISTORY OF NICOTINE DEPENDENCE: ICD-10-CM

## 2022-12-01 PROCEDURE — 93000 ELECTROCARDIOGRAM COMPLETE: CPT

## 2022-12-01 PROCEDURE — 99214 OFFICE O/P EST MOD 30 MIN: CPT | Mod: 25

## 2022-12-01 NOTE — HISTORY OF PRESENT ILLNESS
[FreeTextEntry1] : Patient is a 63yo M with HLD, HTN, former smoker, obesity here for cardiac follow up.  No CP/SOB. Patient denies PND/orthopnea/edema/palpitations/syncope/claudication. NO new complaints. Energy level better and feeling well.  HCTZ added last OV and recommended sleep study. struggles with chronic LBP. 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 \par \par ROS: GI and  negative

## 2022-12-01 NOTE — ASSESSMENT
[FreeTextEntry1] : ECG: SR, LAFB , LAE, NSST\par \par ECHO 5/2021:\par 1. Borderline increased septal wall thickness\par 2. Normal systolic and diastolic function, EF 65-70%\par 3. Normal RV/LA/RA \par 4. Trivial MR, mild TR,. RVSP 26mmHg\par \par \par CAROTID 4/2019:\par 1. All arteries were clearly visualized.\par 2. Normal ICAs\par 3. There is <50% stenosis of the right ECA.\par 4. There is antegrade flow in the right and left vertebral arteries.\par \par \par EX NUCLEAR STRESS TEST 4/2019:\par 1. Normal Sestamibi myocardial perfusion SPECT imaging with artifact as noted above.\par 2. Left ventricular function was hyperdynamic with an EF of 72%.\par 3. The EKG was negative for ischemia.\par 4. The blood pressure was hypertensive baseline with normal response to exercise.\par 5. The patient's functional capacity was average.\par 6. The Duke Treadmill Score was 9, consistent with low risk.\par \par  HDL 44   (2/2019)\par A1C 6.3, Fasting glucose 110 (2/2019)

## 2022-12-01 NOTE — DISCUSSION/SUMMARY
[FreeTextEntry1] : Patient is a 63yo M with HLD, obesity, HTN, IFG, fatty liver, former smoker here for cardiac follow up. \par -Echo with borderline increased septal wall, otherwise unremarkable 5/2021 \par -Needs to lose weight still\par -Suspect FRANK complicating HTN, never had sleep study , BP had been better but now high again. Was rushing around and appears anxious. Will add amlodipine, counselled diet/weight loss and re-evaluate within 2 months. ALso echo to evaluate minor changes in ST segments on  ECG and increase in BP\par \par 1. ADd amlodipine 5mg daily, diet/exercise/weight loss as well for BP. \par 2.Still recommend sleep study, has known FRANK from years ago and needs to be treated/evaluated. Will refer again to [facility] as still not done yet and willing to do now. \par 3. Consider changing HCTZ to chlorthalidone at follow up or increasing CCB. ALso needs echo, known LAFB but some changes in ST segments and will evaluate\par 4. Regular follow up with primary\par 5. Follow up within 2 months\par  [EKG obtained to assist in diagnosis and management of assessed problem(s)] : EKG obtained to assist in diagnosis and management of assessed problem(s)

## 2023-02-08 NOTE — HISTORY OF PRESENT ILLNESS
[FreeTextEntry1] : Patient is a 61yo M with HLD, HTN, former smoker, obesity here for cardiac follow up.  No CP/SOB. Patient denies PND/orthopnea/edema/palpitations/syncope/claudication. NO new complaints. struggles with chronic LBP still. Amlodipine added last OV for better BP control. Recommended echo and sleep study but not done, referred to [facility]. 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 \par \par ROS: GI and  negative

## 2023-02-08 NOTE — PHYSICAL EXAM
[General Appearance - Well Developed] : well developed [General Appearance - Well Nourished] : well nourished [General Appearance - In No Acute Distress] : no acute distress [Normal Conjunctiva] : the conjunctiva exhibited no abnormalities [Normal Oropharynx] : normal oropharynx [Normal Jugular Venous V Waves Present] : normal jugular venous V waves present [] : no respiratory distress [Respiration, Rhythm And Depth] : normal respiratory rhythm and effort [Auscultation Breath Sounds / Voice Sounds] : lungs were clear to auscultation bilaterally [Heart Rate And Rhythm] : heart rate and rhythm were normal [Heart Sounds] : normal S1 and S2 [Murmurs] : no murmurs present [Edema] : no peripheral edema present [Bowel Sounds] : normal bowel sounds [Abdomen Soft] : soft [Abdomen Tenderness] : non-tender [FreeTextEntry1] : obese [Abnormal Walk] : normal gait [Nail Clubbing] : no clubbing of the fingernails [Cyanosis, Localized] : no localized cyanosis [Skin Color & Pigmentation] : normal skin color and pigmentation [Skin Turgor] : normal skin turgor [Oriented To Time, Place, And Person] : oriented to person, place, and time [Affect] : the affect was normal

## 2023-02-08 NOTE — DISCUSSION/SUMMARY
[FreeTextEntry1] : Patient is a 64yo M with HLD, obesity, HTN, IFG, fatty liver, former smoker here for cardiac follow up. \par -Echo with borderline increased septal wall, otherwise unremarkable 5/2021 \par -Needs to lose weight still\par -Suspect FRANK complicating HTN, never had sleep study , BP had been better but now high again. Was rushing around and appears anxious. Will add amlodipine, counselled diet/weight loss and re-evaluate within 2 months. ALso echo to evaluate minor changes in ST segments on  ECG and increase in BP\par \par 1. \par 2.Still recommend sleep study, has known FRANK from years ago and needs to be treated/evaluated. Will refer again to [facility] as still not done yet and willing to do now. \par 3. Consider changing HCTZ to chlorthalidone at follow up or increasing CCB. \par 4. Regular follow up with primary\par 5. Follow up within 2 months\par

## 2023-02-09 ENCOUNTER — APPOINTMENT (OUTPATIENT)
Dept: CARDIOLOGY | Facility: CLINIC | Age: 63
End: 2023-02-09

## 2023-05-17 RX ORDER — AMLODIPINE BESYLATE 5 MG/1
5 TABLET ORAL
Qty: 90 | Refills: 1 | Status: ACTIVE | COMMUNITY
Start: 2022-12-01 | End: 1900-01-01

## 2023-08-14 ENCOUNTER — OFFICE (OUTPATIENT)
Dept: URBAN - METROPOLITAN AREA CLINIC 63 | Facility: CLINIC | Age: 63
Setting detail: OPHTHALMOLOGY
End: 2023-08-14
Payer: COMMERCIAL

## 2023-08-14 DIAGNOSIS — E11.9: ICD-10-CM

## 2023-08-14 DIAGNOSIS — H01.005: ICD-10-CM

## 2023-08-14 DIAGNOSIS — H01.002: ICD-10-CM

## 2023-08-14 DIAGNOSIS — H25.13: ICD-10-CM

## 2023-08-14 PROCEDURE — 92004 COMPRE OPH EXAM NEW PT 1/>: CPT | Performed by: STUDENT IN AN ORGANIZED HEALTH CARE EDUCATION/TRAINING PROGRAM

## 2023-08-14 ASSESSMENT — REFRACTION_AUTOREFRACTION
OS_AXIS: 059
OS_SPHERE: -19.00
OD_AXIS: 022
OS_CYLINDER: -1.00
OD_SPHERE: +1.00
OD_CYLINDER: -0.25

## 2023-08-14 ASSESSMENT — KERATOMETRY
OD_AXISANGLE_DEGREES: 100
OD_K2POWER_DIOPTERS: 43.75
OS_K1POWER_DIOPTERS: 43.50
OD_K1POWER_DIOPTERS: 42.25
OS_AXISANGLE_DEGREES: 081
OS_K2POWER_DIOPTERS: 44.50

## 2023-08-14 ASSESSMENT — LID EXAM ASSESSMENTS
OS_BLEPHARITIS: LLL 1+
OD_BLEPHARITIS: RLL 1+

## 2023-08-14 ASSESSMENT — SPHEQUIV_DERIVED
OD_SPHEQUIV: 0.875
OS_SPHEQUIV: -19.5

## 2023-08-14 ASSESSMENT — TONOMETRY
OS_IOP_MMHG: 17
OD_IOP_MMHG: 18

## 2023-08-14 ASSESSMENT — VISUAL ACUITY
OS_BCVA: 20/20
OD_BCVA: 20/30

## 2023-08-14 ASSESSMENT — REFRACTION_MANIFEST
OS_VA1: 20/20
OD_VA1: 20/20
OS_ADD: +2.00
OS_SPHERE: -0.50
OD_ADD: +2.00
OD_SPHERE: +1.00

## 2023-08-14 ASSESSMENT — AXIALLENGTH_DERIVED
OD_AL: 23.4357
OS_AL: 34.4

## 2023-08-28 ENCOUNTER — OFFICE (OUTPATIENT)
Dept: URBAN - METROPOLITAN AREA CLINIC 63 | Facility: CLINIC | Age: 63
Setting detail: OPHTHALMOLOGY
End: 2023-08-28
Payer: COMMERCIAL

## 2023-08-28 DIAGNOSIS — H16.223: ICD-10-CM

## 2023-08-28 PROBLEM — H52.4 PRESBYOPIA: Status: ACTIVE | Noted: 2023-08-14

## 2023-08-28 PROBLEM — H25.13 CATARACT SENILE NUCLEAR SCLEROSIS; BOTH EYES: Status: ACTIVE | Noted: 2023-08-14

## 2023-08-28 PROBLEM — E11.9 DIABETES TYPE 2 NO RETINOPATHY ; BOTH EYES: Status: ACTIVE | Noted: 2023-08-14

## 2023-08-28 PROCEDURE — 68761 CLOSE TEAR DUCT OPENING: CPT | Performed by: STUDENT IN AN ORGANIZED HEALTH CARE EDUCATION/TRAINING PROGRAM

## 2023-08-28 PROCEDURE — 92012 INTRM OPH EXAM EST PATIENT: CPT | Performed by: STUDENT IN AN ORGANIZED HEALTH CARE EDUCATION/TRAINING PROGRAM

## 2023-08-28 ASSESSMENT — SPHEQUIV_DERIVED
OD_SPHEQUIV: 0.75
OS_SPHEQUIV: -20.125

## 2023-08-28 ASSESSMENT — CONFRONTATIONAL VISUAL FIELD TEST (CVF)
OS_FINDINGS: FULL
OD_FINDINGS: FULL

## 2023-08-28 ASSESSMENT — REFRACTION_MANIFEST
OD_SPHERE: +1.00
OS_ADD: +2.00
OD_ADD: +2.00
OS_VA1: 20/20
OD_VA1: 20/20
OS_SPHERE: -0.50

## 2023-08-28 ASSESSMENT — VISUAL ACUITY
OS_BCVA: 20/20
OD_BCVA: 20/20

## 2023-08-28 ASSESSMENT — REFRACTION_AUTOREFRACTION
OS_AXIS: 101
OD_CYLINDER: -1.00
OD_SPHERE: +1.25
OS_CYLINDER: -0.75
OS_SPHERE: -19.75
OD_AXIS: 007

## 2023-08-28 ASSESSMENT — KERATOMETRY
OD_K1POWER_DIOPTERS: 42.75
OS_K2POWER_DIOPTERS: 45.00
OS_AXISANGLE_DEGREES: 084
OD_AXISANGLE_DEGREES: 091
OD_K2POWER_DIOPTERS: 44.25
OS_K1POWER_DIOPTERS: 44.00

## 2023-08-28 ASSESSMENT — TONOMETRY
OD_IOP_MMHG: 19
OS_IOP_MMHG: 17

## 2023-08-28 ASSESSMENT — LID EXAM ASSESSMENTS
OD_BLEPHARITIS: RLL 1+
OS_BLEPHARITIS: LLL 1+

## 2023-08-28 ASSESSMENT — AXIALLENGTH_DERIVED
OD_AL: 23.3033
OS_AL: 34.53

## 2023-09-11 ENCOUNTER — OFFICE (OUTPATIENT)
Dept: URBAN - METROPOLITAN AREA CLINIC 63 | Facility: CLINIC | Age: 63
Setting detail: OPHTHALMOLOGY
End: 2023-09-11
Payer: COMMERCIAL

## 2023-09-11 DIAGNOSIS — H16.223: ICD-10-CM

## 2023-09-11 PROCEDURE — 92012 INTRM OPH EXAM EST PATIENT: CPT | Performed by: STUDENT IN AN ORGANIZED HEALTH CARE EDUCATION/TRAINING PROGRAM

## 2023-09-11 ASSESSMENT — KERATOMETRY
OS_K1POWER_DIOPTERS: 43.75
OD_AXISANGLE_DEGREES: 088
OD_K2POWER_DIOPTERS: 44.75
OS_K2POWER_DIOPTERS: 44.50
OD_K1POWER_DIOPTERS: 42.75
OS_AXISANGLE_DEGREES: 075

## 2023-09-11 ASSESSMENT — TONOMETRY
OD_IOP_MMHG: 20
OS_IOP_MMHG: 19

## 2023-09-11 ASSESSMENT — REFRACTION_AUTOREFRACTION
OD_SPHERE: +1.25
OS_SPHERE: -19.50
OD_CYLINDER: -0.50
OS_CYLINDER: -0.50
OD_AXIS: 006
OS_AXIS: 071

## 2023-09-11 ASSESSMENT — AXIALLENGTH_DERIVED
OD_AL: 23.1201
OS_AL: 34.51

## 2023-09-11 ASSESSMENT — REFRACTION_MANIFEST
OS_ADD: +2.00
OS_VA1: 20/20
OS_SPHERE: -0.50
OD_SPHERE: +1.00
OD_ADD: +2.00
OD_VA1: 20/20

## 2023-09-11 ASSESSMENT — CONFRONTATIONAL VISUAL FIELD TEST (CVF)
OD_FINDINGS: FULL
OS_FINDINGS: FULL

## 2023-09-11 ASSESSMENT — VISUAL ACUITY
OS_BCVA: 20/25
OD_BCVA: 20/25

## 2023-09-11 ASSESSMENT — SPHEQUIV_DERIVED
OD_SPHEQUIV: 1
OS_SPHEQUIV: -19.75

## 2023-09-11 ASSESSMENT — LID EXAM ASSESSMENTS
OD_BLEPHARITIS: RLL 1+
OS_BLEPHARITIS: LLL 1+

## 2023-09-28 ENCOUNTER — OFFICE (OUTPATIENT)
Dept: URBAN - METROPOLITAN AREA CLINIC 63 | Facility: CLINIC | Age: 63
Setting detail: OPHTHALMOLOGY
End: 2023-09-28
Payer: COMMERCIAL

## 2023-09-28 ENCOUNTER — RX ONLY (RX ONLY)
Age: 63
End: 2023-09-28

## 2023-09-28 DIAGNOSIS — H02.423: ICD-10-CM

## 2023-09-28 DIAGNOSIS — H02.524: ICD-10-CM

## 2023-09-28 DIAGNOSIS — H02.831: ICD-10-CM

## 2023-09-28 DIAGNOSIS — H02.521: ICD-10-CM

## 2023-09-28 PROBLEM — H02.832 DERMATOCHALASIS; RIGHT UPPER LID, RIGHT LOWER LID, LEFT UPPER LID, LEFT LOWER LID: Status: ACTIVE | Noted: 2023-09-28

## 2023-09-28 PROBLEM — H02.422 PTOSIS MYOGENIC; RIGHT EYE, LEFT EYE: Status: ACTIVE | Noted: 2023-09-28

## 2023-09-28 PROBLEM — H02.834 DERMATOCHALASIS; RIGHT UPPER LID, RIGHT LOWER LID, LEFT UPPER LID, LEFT LOWER LID: Status: ACTIVE | Noted: 2023-09-28

## 2023-09-28 PROBLEM — H02.835 DERMATOCHALASIS; RIGHT UPPER LID, RIGHT LOWER LID, LEFT UPPER LID, LEFT LOWER LID: Status: ACTIVE | Noted: 2023-09-28

## 2023-09-28 PROBLEM — H02.421 PTOSIS MYOGENIC; RIGHT EYE, LEFT EYE: Status: ACTIVE | Noted: 2023-09-28

## 2023-09-28 PROCEDURE — 92083 EXTENDED VISUAL FIELD XM: CPT | Performed by: OPHTHALMOLOGY

## 2023-09-28 PROCEDURE — 92285 EXTERNAL OCULAR PHOTOGRAPHY: CPT | Performed by: OPHTHALMOLOGY

## 2023-09-28 PROCEDURE — 99214 OFFICE O/P EST MOD 30 MIN: CPT | Performed by: OPHTHALMOLOGY

## 2023-09-28 ASSESSMENT — REFRACTION_MANIFEST
OS_SPHERE: -0.50
OS_ADD: +2.00
OD_SPHERE: +1.00
OS_VA1: 20/20
OD_ADD: +2.00
OD_VA1: 20/20

## 2023-09-28 ASSESSMENT — SPHEQUIV_DERIVED
OD_SPHEQUIV: 1
OS_SPHEQUIV: -19.75

## 2023-09-28 ASSESSMENT — LID EXAM ASSESSMENTS
OS_BLEPHARITIS: LLL 1+
OD_BLEPHARITIS: RLL 1+

## 2023-09-28 ASSESSMENT — REFRACTION_AUTOREFRACTION
OD_AXIS: 006
OS_AXIS: 071
OD_SPHERE: +1.25
OS_CYLINDER: -0.50
OD_CYLINDER: -0.50
OS_SPHERE: -19.50

## 2023-09-28 ASSESSMENT — AXIALLENGTH_DERIVED
OS_AL: 34.51
OD_AL: 23.1201

## 2023-09-28 ASSESSMENT — CONFRONTATIONAL VISUAL FIELD TEST (CVF)
OD_FINDINGS: FULL
OS_FINDINGS: FULL

## 2023-09-28 ASSESSMENT — KERATOMETRY
OS_K2POWER_DIOPTERS: 44.50
OD_AXISANGLE_DEGREES: 088
OD_K2POWER_DIOPTERS: 44.75
OD_K1POWER_DIOPTERS: 42.75
OS_AXISANGLE_DEGREES: 075
OS_K1POWER_DIOPTERS: 43.75

## 2023-09-28 ASSESSMENT — TONOMETRY
OS_IOP_MMHG: 18
OD_IOP_MMHG: 18

## 2023-09-28 ASSESSMENT — VISUAL ACUITY
OS_BCVA: 20/25+1
OD_BCVA: 20/20-1

## 2023-12-03 ENCOUNTER — TRANSCRIPTION ENCOUNTER (OUTPATIENT)
Age: 63
End: 2023-12-03

## 2023-12-04 ENCOUNTER — OUTPATIENT (OUTPATIENT)
Dept: OUTPATIENT SERVICES | Facility: HOSPITAL | Age: 63
LOS: 1 days | End: 2023-12-04
Payer: COMMERCIAL

## 2023-12-04 DIAGNOSIS — M72.2 PLANTAR FASCIAL FIBROMATOSIS: ICD-10-CM

## 2023-12-04 LAB
GLUCOSE BLDC GLUCOMTR-MCNC: 110 MG/DL — HIGH (ref 70–99)
GLUCOSE BLDC GLUCOMTR-MCNC: 110 MG/DL — HIGH (ref 70–99)

## 2023-12-04 PROCEDURE — 20550 NJX 1 TENDON SHEATH/LIGAMENT: CPT | Mod: 50

## 2023-12-04 PROCEDURE — 77002 NEEDLE LOCALIZATION BY XRAY: CPT

## 2023-12-04 PROCEDURE — 82962 GLUCOSE BLOOD TEST: CPT

## 2024-01-07 ENCOUNTER — TRANSCRIPTION ENCOUNTER (OUTPATIENT)
Age: 64
End: 2024-01-07

## 2024-01-08 ENCOUNTER — OUTPATIENT (OUTPATIENT)
Dept: OUTPATIENT SERVICES | Facility: HOSPITAL | Age: 64
LOS: 1 days | End: 2024-01-08
Payer: COMMERCIAL

## 2024-01-08 DIAGNOSIS — M54.16 RADICULOPATHY, LUMBAR REGION: ICD-10-CM

## 2024-01-08 DIAGNOSIS — M25.561 PAIN IN RIGHT KNEE: ICD-10-CM

## 2024-01-08 DIAGNOSIS — M25.562 PAIN IN LEFT KNEE: ICD-10-CM

## 2024-01-08 LAB
GLUCOSE BLDC GLUCOMTR-MCNC: 108 MG/DL — HIGH (ref 70–99)
GLUCOSE BLDC GLUCOMTR-MCNC: 108 MG/DL — HIGH (ref 70–99)

## 2024-01-08 PROCEDURE — 76000 FLUOROSCOPY <1 HR PHYS/QHP: CPT

## 2024-01-08 PROCEDURE — 82962 GLUCOSE BLOOD TEST: CPT

## 2024-01-08 PROCEDURE — 64483 NJX AA&/STRD TFRM EPI L/S 1: CPT | Mod: 50

## 2024-01-15 ENCOUNTER — EMERGENCY (EMERGENCY)
Facility: HOSPITAL | Age: 64
LOS: 1 days | Discharge: HOME | End: 2024-01-15
Attending: EMERGENCY MEDICINE
Payer: COMMERCIAL

## 2024-01-15 VITALS
WEIGHT: 191.36 LBS | TEMPERATURE: 99 F | DIASTOLIC BLOOD PRESSURE: 80 MMHG | SYSTOLIC BLOOD PRESSURE: 200 MMHG | OXYGEN SATURATION: 98 % | RESPIRATION RATE: 18 BRPM | HEART RATE: 63 BPM

## 2024-01-15 LAB
ALBUMIN SERPL ELPH-MCNC: 4.1 G/DL — SIGNIFICANT CHANGE UP (ref 3.3–5.2)
ALBUMIN SERPL ELPH-MCNC: 4.1 G/DL — SIGNIFICANT CHANGE UP (ref 3.3–5.2)
ALP SERPL-CCNC: 97 U/L — SIGNIFICANT CHANGE UP (ref 40–120)
ALP SERPL-CCNC: 97 U/L — SIGNIFICANT CHANGE UP (ref 40–120)
ALT FLD-CCNC: 27 U/L — SIGNIFICANT CHANGE UP
ALT FLD-CCNC: 27 U/L — SIGNIFICANT CHANGE UP
ANION GAP SERPL CALC-SCNC: 14 MMOL/L — SIGNIFICANT CHANGE UP (ref 5–17)
ANION GAP SERPL CALC-SCNC: 14 MMOL/L — SIGNIFICANT CHANGE UP (ref 5–17)
AST SERPL-CCNC: 29 U/L — SIGNIFICANT CHANGE UP
AST SERPL-CCNC: 29 U/L — SIGNIFICANT CHANGE UP
BASOPHILS # BLD AUTO: 0.08 K/UL — SIGNIFICANT CHANGE UP (ref 0–0.2)
BASOPHILS # BLD AUTO: 0.08 K/UL — SIGNIFICANT CHANGE UP (ref 0–0.2)
BASOPHILS NFR BLD AUTO: 0.7 % — SIGNIFICANT CHANGE UP (ref 0–2)
BASOPHILS NFR BLD AUTO: 0.7 % — SIGNIFICANT CHANGE UP (ref 0–2)
BILIRUB SERPL-MCNC: 0.8 MG/DL — SIGNIFICANT CHANGE UP (ref 0.4–2)
BILIRUB SERPL-MCNC: 0.8 MG/DL — SIGNIFICANT CHANGE UP (ref 0.4–2)
BLD GP AB SCN SERPL QL: SIGNIFICANT CHANGE UP
BLD GP AB SCN SERPL QL: SIGNIFICANT CHANGE UP
BUN SERPL-MCNC: 19.7 MG/DL — SIGNIFICANT CHANGE UP (ref 8–20)
BUN SERPL-MCNC: 19.7 MG/DL — SIGNIFICANT CHANGE UP (ref 8–20)
CALCIUM SERPL-MCNC: 9.2 MG/DL — SIGNIFICANT CHANGE UP (ref 8.4–10.5)
CALCIUM SERPL-MCNC: 9.2 MG/DL — SIGNIFICANT CHANGE UP (ref 8.4–10.5)
CHLORIDE SERPL-SCNC: 98 MMOL/L — SIGNIFICANT CHANGE UP (ref 96–108)
CHLORIDE SERPL-SCNC: 98 MMOL/L — SIGNIFICANT CHANGE UP (ref 96–108)
CO2 SERPL-SCNC: 21 MMOL/L — LOW (ref 22–29)
CO2 SERPL-SCNC: 21 MMOL/L — LOW (ref 22–29)
CREAT SERPL-MCNC: 0.65 MG/DL — SIGNIFICANT CHANGE UP (ref 0.5–1.3)
CREAT SERPL-MCNC: 0.65 MG/DL — SIGNIFICANT CHANGE UP (ref 0.5–1.3)
EGFR: 107 ML/MIN/1.73M2 — SIGNIFICANT CHANGE UP
EGFR: 107 ML/MIN/1.73M2 — SIGNIFICANT CHANGE UP
EOSINOPHIL # BLD AUTO: 0.17 K/UL — SIGNIFICANT CHANGE UP (ref 0–0.5)
EOSINOPHIL # BLD AUTO: 0.17 K/UL — SIGNIFICANT CHANGE UP (ref 0–0.5)
EOSINOPHIL NFR BLD AUTO: 1.4 % — SIGNIFICANT CHANGE UP (ref 0–6)
EOSINOPHIL NFR BLD AUTO: 1.4 % — SIGNIFICANT CHANGE UP (ref 0–6)
GLUCOSE SERPL-MCNC: 118 MG/DL — HIGH (ref 70–99)
GLUCOSE SERPL-MCNC: 118 MG/DL — HIGH (ref 70–99)
HCT VFR BLD CALC: 48 % — SIGNIFICANT CHANGE UP (ref 39–50)
HCT VFR BLD CALC: 48 % — SIGNIFICANT CHANGE UP (ref 39–50)
HGB BLD-MCNC: 16.4 G/DL — SIGNIFICANT CHANGE UP (ref 13–17)
HGB BLD-MCNC: 16.4 G/DL — SIGNIFICANT CHANGE UP (ref 13–17)
IMM GRANULOCYTES NFR BLD AUTO: 1.2 % — HIGH (ref 0–0.9)
IMM GRANULOCYTES NFR BLD AUTO: 1.2 % — HIGH (ref 0–0.9)
LYMPHOCYTES # BLD AUTO: 29.1 % — SIGNIFICANT CHANGE UP (ref 13–44)
LYMPHOCYTES # BLD AUTO: 29.1 % — SIGNIFICANT CHANGE UP (ref 13–44)
LYMPHOCYTES # BLD AUTO: 3.51 K/UL — HIGH (ref 1–3.3)
LYMPHOCYTES # BLD AUTO: 3.51 K/UL — HIGH (ref 1–3.3)
MCHC RBC-ENTMCNC: 27.4 PG — SIGNIFICANT CHANGE UP (ref 27–34)
MCHC RBC-ENTMCNC: 27.4 PG — SIGNIFICANT CHANGE UP (ref 27–34)
MCHC RBC-ENTMCNC: 34.2 GM/DL — SIGNIFICANT CHANGE UP (ref 32–36)
MCHC RBC-ENTMCNC: 34.2 GM/DL — SIGNIFICANT CHANGE UP (ref 32–36)
MCV RBC AUTO: 80.3 FL — SIGNIFICANT CHANGE UP (ref 80–100)
MCV RBC AUTO: 80.3 FL — SIGNIFICANT CHANGE UP (ref 80–100)
MONOCYTES # BLD AUTO: 1.13 K/UL — HIGH (ref 0–0.9)
MONOCYTES # BLD AUTO: 1.13 K/UL — HIGH (ref 0–0.9)
MONOCYTES NFR BLD AUTO: 9.4 % — SIGNIFICANT CHANGE UP (ref 2–14)
MONOCYTES NFR BLD AUTO: 9.4 % — SIGNIFICANT CHANGE UP (ref 2–14)
NEUTROPHILS # BLD AUTO: 7.05 K/UL — SIGNIFICANT CHANGE UP (ref 1.8–7.4)
NEUTROPHILS # BLD AUTO: 7.05 K/UL — SIGNIFICANT CHANGE UP (ref 1.8–7.4)
NEUTROPHILS NFR BLD AUTO: 58.2 % — SIGNIFICANT CHANGE UP (ref 43–77)
NEUTROPHILS NFR BLD AUTO: 58.2 % — SIGNIFICANT CHANGE UP (ref 43–77)
PLATELET # BLD AUTO: 387 K/UL — SIGNIFICANT CHANGE UP (ref 150–400)
PLATELET # BLD AUTO: 387 K/UL — SIGNIFICANT CHANGE UP (ref 150–400)
POTASSIUM SERPL-MCNC: 4.1 MMOL/L — SIGNIFICANT CHANGE UP (ref 3.5–5.3)
POTASSIUM SERPL-MCNC: 4.1 MMOL/L — SIGNIFICANT CHANGE UP (ref 3.5–5.3)
POTASSIUM SERPL-SCNC: 4.1 MMOL/L — SIGNIFICANT CHANGE UP (ref 3.5–5.3)
POTASSIUM SERPL-SCNC: 4.1 MMOL/L — SIGNIFICANT CHANGE UP (ref 3.5–5.3)
PROT SERPL-MCNC: 7.6 G/DL — SIGNIFICANT CHANGE UP (ref 6.6–8.7)
PROT SERPL-MCNC: 7.6 G/DL — SIGNIFICANT CHANGE UP (ref 6.6–8.7)
RBC # BLD: 5.98 M/UL — HIGH (ref 4.2–5.8)
RBC # BLD: 5.98 M/UL — HIGH (ref 4.2–5.8)
RBC # FLD: 14 % — SIGNIFICANT CHANGE UP (ref 10.3–14.5)
RBC # FLD: 14 % — SIGNIFICANT CHANGE UP (ref 10.3–14.5)
SODIUM SERPL-SCNC: 133 MMOL/L — LOW (ref 135–145)
SODIUM SERPL-SCNC: 133 MMOL/L — LOW (ref 135–145)
TROPONIN T, HIGH SENSITIVITY RESULT: 8 NG/L — SIGNIFICANT CHANGE UP (ref 0–51)
TROPONIN T, HIGH SENSITIVITY RESULT: 8 NG/L — SIGNIFICANT CHANGE UP (ref 0–51)
WBC # BLD: 12.08 K/UL — HIGH (ref 3.8–10.5)
WBC # BLD: 12.08 K/UL — HIGH (ref 3.8–10.5)
WBC # FLD AUTO: 12.08 K/UL — HIGH (ref 3.8–10.5)
WBC # FLD AUTO: 12.08 K/UL — HIGH (ref 3.8–10.5)

## 2024-01-15 PROCEDURE — 99223 1ST HOSP IP/OBS HIGH 75: CPT

## 2024-01-15 PROCEDURE — 71045 X-RAY EXAM CHEST 1 VIEW: CPT | Mod: 26

## 2024-01-15 PROCEDURE — 70450 CT HEAD/BRAIN W/O DYE: CPT | Mod: 26,MA

## 2024-01-15 PROCEDURE — 93010 ELECTROCARDIOGRAM REPORT: CPT

## 2024-01-15 PROCEDURE — 93880 EXTRACRANIAL BILAT STUDY: CPT | Mod: 26

## 2024-01-15 PROCEDURE — 70551 MRI BRAIN STEM W/O DYE: CPT | Mod: 26,MA

## 2024-01-15 RX ORDER — VALSARTAN 80 MG/1
320 TABLET ORAL DAILY
Refills: 0 | Status: DISCONTINUED | OUTPATIENT
Start: 2024-01-16 | End: 2024-01-22

## 2024-01-15 RX ORDER — LISINOPRIL 2.5 MG/1
20 TABLET ORAL DAILY
Refills: 0 | Status: DISCONTINUED | OUTPATIENT
Start: 2024-01-15 | End: 2024-01-15

## 2024-01-15 RX ORDER — METFORMIN HYDROCHLORIDE 850 MG/1
1000 TABLET ORAL
Refills: 0 | Status: DISCONTINUED | OUTPATIENT
Start: 2024-01-15 | End: 2024-01-22

## 2024-01-15 RX ORDER — ATORVASTATIN CALCIUM 80 MG/1
10 TABLET, FILM COATED ORAL AT BEDTIME
Refills: 0 | Status: DISCONTINUED | OUTPATIENT
Start: 2024-01-15 | End: 2024-01-22

## 2024-01-15 RX ORDER — ASPIRIN/CALCIUM CARB/MAGNESIUM 324 MG
81 TABLET ORAL DAILY
Refills: 0 | Status: DISCONTINUED | OUTPATIENT
Start: 2024-01-15 | End: 2024-01-22

## 2024-01-15 RX ORDER — METOCLOPRAMIDE HCL 10 MG
10 TABLET ORAL ONCE
Refills: 0 | Status: COMPLETED | OUTPATIENT
Start: 2024-01-15 | End: 2024-01-15

## 2024-01-15 RX ORDER — DEXAMETHASONE 0.5 MG/5ML
10 ELIXIR ORAL ONCE
Refills: 0 | Status: COMPLETED | OUTPATIENT
Start: 2024-01-15 | End: 2024-01-15

## 2024-01-15 RX ORDER — DIAZEPAM 5 MG
5 TABLET ORAL ONCE
Refills: 0 | Status: DISCONTINUED | OUTPATIENT
Start: 2024-01-15 | End: 2024-01-15

## 2024-01-15 RX ORDER — AMLODIPINE BESYLATE 2.5 MG/1
10 TABLET ORAL DAILY
Refills: 0 | Status: DISCONTINUED | OUTPATIENT
Start: 2024-01-15 | End: 2024-01-15

## 2024-01-15 RX ORDER — METOPROLOL TARTRATE 50 MG
100 TABLET ORAL DAILY
Refills: 0 | Status: DISCONTINUED | OUTPATIENT
Start: 2024-01-15 | End: 2024-01-22

## 2024-01-15 RX ORDER — DIPHENHYDRAMINE HCL 50 MG
25 CAPSULE ORAL ONCE
Refills: 0 | Status: COMPLETED | OUTPATIENT
Start: 2024-01-15 | End: 2024-01-15

## 2024-01-15 RX ADMIN — Medication 5 MILLIGRAM(S): at 13:33

## 2024-01-15 RX ADMIN — Medication 102 MILLIGRAM(S): at 18:37

## 2024-01-15 RX ADMIN — Medication 25 MILLIGRAM(S): at 13:32

## 2024-01-15 RX ADMIN — ATORVASTATIN CALCIUM 10 MILLIGRAM(S): 80 TABLET, FILM COATED ORAL at 21:45

## 2024-01-15 RX ADMIN — METFORMIN HYDROCHLORIDE 1000 MILLIGRAM(S): 850 TABLET ORAL at 18:37

## 2024-01-15 RX ADMIN — Medication 10 MILLIGRAM(S): at 13:32

## 2024-01-15 NOTE — ED PROVIDER NOTE - CLINICAL SUMMARY MEDICAL DECISION MAKING FREE TEXT BOX
The patient presents with 3 weeks of dizziness and will place the patient in ED observation and perform MRI

## 2024-01-15 NOTE — ED PROVIDER NOTE - NS ED MD DISPO DIVISION OBS
[facility]

## 2024-01-15 NOTE — CONSULT NOTE ADULT - ASSESSMENT
The patient is a 63y Male who is followed by neurology because of vertigo    Vertigo  given ear pain, tinnitus, nausea  and unilateral nystagmus suspect peripheral etiology  can try steroids to see if helps  MRI brain to evaluate for stroke given his risk factors of HTN. HLD and DM    If MRI is negative can be discharged with outpatient ENT eval  If MRI shows stroke will need admission for further workup    Thank you for allowing me to participate in the care of your patient    Wilberto Guidry MD, PhD   [id number] The patient is a 63y Male who is followed by neurology because of vertigo    Vertigo  given ear pain, tinnitus, nausea  and unilateral nystagmus suspect peripheral etiology  can try steroids to see if helps  MRI brain to evaluate for stroke given his risk factors of HTN. HLD and DM    If MRI is negative can be discharged with outpatient ENT eval  If MRI shows stroke will need admission for further workup    Thank you for allowing me to participate in the care of your patient    Wilberto Guidry MD, PhD   [id number] The patient is a 63y Male who is followed by neurology because of vertigo    Vertigo  given ear pain, tinnitus, nausea  and unilateral nystagmus suspect peripheral etiology  can try steroids to see if helps  MRI brain to evaluate for stroke given his risk factors of HTN. HLD and DM    If MRI is negative can be discharged with outpatient ENT eval  If MRI shows stroke will need admission for further workup    Thank you for allowing me to participate in the care of your patient    Wilberto Guidry MD, PhD   [id number]

## 2024-01-15 NOTE — ED CDU PROVIDER INITIAL DAY NOTE - ATTENDING APP SHARED VISIT CONTRIBUTION OF CARE
63-year-old male history of hyperlipidemia, hypertension, and diabetes presents to the emergency department with dizziness over the past 3 weeks.  Patient states symptoms started 3 weeks ago when he noticed some humming sound out of his right ear and some decreased hearing on the right side as well.  He states he then became dizzy.  He describes the dizziness/spinning sensation.  States symptoms have been worsening over the past 3 weeks.  He had a CT scan performed which showed mild chronic microvascular changes but no evidence of infarction or hemorrhage.  He is placed in observation for MRI.  Neurology was also consulted. Patient states that he is feeling better since being medicated.    pt feeling improved, but still symptomatic. will obtain mri, treat symptoms, neuro reccs

## 2024-01-15 NOTE — ED ADULT TRIAGE NOTE - CHIEF COMPLAINT QUOTE
dizziness for 3 weeks with R ear pain. hx HTn did not take mediction today dizziness for 3 weeks with R ear pain. hx HTn did not take mediction today. unsteady gait

## 2024-01-15 NOTE — ED PROVIDER NOTE - CARDIOVASCULAR NEGATIVE STATEMENT, MLM
Spoke with pt. Continues to have urgency, frequency but now has fever 103F with chills. Just took Tylenol. Denies dysuria or gross hematuria. Pt is S/P rotator cuff surgery. Advised pt, that due to COVID, he should go to the [facility] urgent care for evaluation. Verbalized understanding.    no chest pain and no edema.

## 2024-01-15 NOTE — ED PROVIDER NOTE - OBJECTIVE STATEMENT
The patient presents with dizziness for 3 weeks described as ground moving sensation with poor gait  Went to different hospital 3 weeks ago but not getting better  No HA, No CP, No SOB, No abd pain

## 2024-01-15 NOTE — CONSULT NOTE ADULT - SUBJECTIVE AND OBJECTIVE BOX
[facility]                                     Neurology at [place]                                 Brea De La O, & John Paul                                  [address]                                        [place], [place], [address]                                             [phone]    CC: vertigo  HPI:  The patient is a 63y Male who presented with vertigo for 2-3 weeks.  He has spinning sensation woith nausea.  he has right ear pain and tinnitus.  he went to a different hospital and says that not much was done to help him.  He feels that the vertigo is worse the past two days.  neurology is asked to evaluate.    PAST MEDICAL & SURGICAL HISTORY:  HTN  HLD  DM    MEDICATIONS  (STANDING):  amLODIPine   Tablet 10 milliGRAM(s) Oral daily  aspirin  chewable 81 milliGRAM(s) Oral daily  atorvastatin 10 milliGRAM(s) Oral at bedtime  lisinopril 20 milliGRAM(s) Oral daily  metFORMIN 1000 milliGRAM(s) Oral two times a day  metoprolol succinate  milliGRAM(s) Oral daily    MEDICATIONS  (PRN):      Allergies    No Known Allergies    Intolerances        SOCIAL HISTORY:  no tob,   no alcohol   no drugs    FAMILY HISTORY:  n/c      ROS: 14 point ROS negative other than what is present in HPI or below    Vital Signs Last 24 Hrs  T(C): 36.2 (15 Francisco Javier 2024 15:41), Max: 37.2 (15 Francisco Javier 2024 11:32)  T(F): 97.1 (15 Francisco Javier 2024 15:41), Max: 99 (15 Francisco Javier 2024 11:32)  HR: 66 (15 Francisco Javier 2024 15:41) (63 - 66)  BP: 148/74 (15 Francisco Javier 2024 15:41) (148/74 - 200/80)  BP(mean): --  RR: 16 (15 Francisco Javier 2024 15:41) (16 - 18)  SpO2: 95% (15 Francisco Javier 2024 15:41) (95% - 98%)    Parameters below as of 15 Francisco Javier 2024 15:41  Patient On (Oxygen Delivery Method): room air      General: NAD    Detailed Neurologic Exam:    Mental status: The patient is awake and alert and has normal attention span.  The patient is fully oriented in 3 spheres. The patient is oriented to current events. The patient is able to name objects, follow commands, repeat sentences.    Cranial nerves: Pupils equal and react symmetrically to light. There is no visual field deficit to confrontation. Extraocular motion is full with left gaze induced nystagmus. There is no ptosis. Facial sensation is intact. Facial musculature is symmetric. Palate elevates symmetrically. Tongue is midline.    Motor: There is normal bulk and tone.  There is no tremor.  Strength is 5/5 in the right arm and leg.   Strength is 5/5 in the left arm and leg.    Sensation: Intact to light touch and pin in 4 extremities    Cerebellar: There is no dysmetria on finger to nose testing.    Gait : deferred    LABS:                         16.4   12.08 )-----------( 387      ( 15 Francisco Javier 2024 12:21 )             48.0       01-15    133<L>  |  98  |  19.7  ----------------------------<  118<H>  4.1   |  21.0<L>  |  0.65    Ca    9.2      15 Francisco Javier 2024 12:21    TPro  7.6  /  Alb  4.1  /  TBili  0.8  /  DBili  x   /  AST  29  /  ALT  27  /  AlkPhos  97  01-15    RADIOLOGY & ADDITIONAL STUDIES (independently reviewed unless otherwise noted):  CT Head No Cont (01.15.24 @ 14:19)     IMPRESSION:  Mild chronic microvascular changes without evidence of an   acute transcortical infarction or hemorrhage. Moderate chronic sinusitis.                                    [facility]                                     Neurology at [place]                                 Brea De La O, & John Paul                                  [address]                                        [place], [place], [address]                                             [phone]    CC: vertigo  HPI:  The patient is a 63y Male who presented with vertigo for 2-3 weeks.  He has spinning sensation woith nausea.  he has right ear pain and tinnitus.  he went to a different hospital and says that not much was done to help him.  He feels that the vertigo is worse the past two days.  neurology is asked to evaluate.    PAST MEDICAL & SURGICAL HISTORY:  HTN  HLD  DM    MEDICATIONS  (STANDING):  amLODIPine   Tablet 10 milliGRAM(s) Oral daily  aspirin  chewable 81 milliGRAM(s) Oral daily  atorvastatin 10 milliGRAM(s) Oral at bedtime  lisinopril 20 milliGRAM(s) Oral daily  metFORMIN 1000 milliGRAM(s) Oral two times a day  metoprolol succinate  milliGRAM(s) Oral daily    MEDICATIONS  (PRN):      Allergies    No Known Allergies    Intolerances        SOCIAL HISTORY:  no tob,   no alcohol   no drugs    FAMILY HISTORY:  n/c      ROS: 14 point ROS negative other than what is present in HPI or below    Vital Signs Last 24 Hrs  T(C): 36.2 (15 Francisco Javier 2024 15:41), Max: 37.2 (15 Francisco Javier 2024 11:32)  T(F): 97.1 (15 Francisco Javier 2024 15:41), Max: 99 (15 Francisco Javier 2024 11:32)  HR: 66 (15 Francisco Javier 2024 15:41) (63 - 66)  BP: 148/74 (15 Francisco Javier 2024 15:41) (148/74 - 200/80)  BP(mean): --  RR: 16 (15 Francisco Javier 2024 15:41) (16 - 18)  SpO2: 95% (15 Francisco Javier 2024 15:41) (95% - 98%)    Parameters below as of 15 Francisco Javier 2024 15:41  Patient On (Oxygen Delivery Method): room air      General: NAD    Detailed Neurologic Exam:    Mental status: The patient is awake and alert and has normal attention span.  The patient is fully oriented in 3 spheres. The patient is oriented to current events. The patient is able to name objects, follow commands, repeat sentences.    Cranial nerves: Pupils equal and react symmetrically to light. There is no visual field deficit to confrontation. Extraocular motion is full with left gaze induced nystagmus. There is no ptosis. Facial sensation is intact. Facial musculature is symmetric. Palate elevates symmetrically. Tongue is midline.    Motor: There is normal bulk and tone.  There is no tremor.  Strength is 5/5 in the right arm and leg.   Strength is 5/5 in the left arm and leg.    Sensation: Intact to light touch and pin in 4 extremities    Cerebellar: There is no dysmetria on finger to nose testing.    Gait : deferred    LABS:                         16.4   12.08 )-----------( 387      ( 15 Francisco Javier 2024 12:21 )             48.0       01-15    133<L>  |  98  |  19.7  ----------------------------<  118<H>  4.1   |  21.0<L>  |  0.65    Ca    9.2      15 Francisco Javier 2024 12:21    TPro  7.6  /  Alb  4.1  /  TBili  0.8  /  DBili  x   /  AST  29  /  ALT  27  /  AlkPhos  97  01-15    RADIOLOGY & ADDITIONAL STUDIES (independently reviewed unless otherwise noted):  CT Head No Cont (01.15.24 @ 14:19)     IMPRESSION:  Mild chronic microvascular changes without evidence of an   acute transcortical infarction or hemorrhage. Moderate chronic sinusitis.                                    [facility]                                     Neurology at [place]                                 Brea De La O, & John Paul                                  [address]                                        [place], [place], [address]                                             [phone]    CC: vertigo  HPI:  The patient is a 63y Male who presented with vertigo for 2-3 weeks.  He has spinning sensation woith nausea.  he has right ear pain and tinnitus.  he went to a different hospital and says that not much was done to help him.  He feels that the vertigo is worse the past two days.  neurology is asked to evaluate.    PAST MEDICAL & SURGICAL HISTORY:  HTN  HLD  DM    MEDICATIONS  (STANDING):  amLODIPine   Tablet 10 milliGRAM(s) Oral daily  aspirin  chewable 81 milliGRAM(s) Oral daily  atorvastatin 10 milliGRAM(s) Oral at bedtime  lisinopril 20 milliGRAM(s) Oral daily  metFORMIN 1000 milliGRAM(s) Oral two times a day  metoprolol succinate  milliGRAM(s) Oral daily    MEDICATIONS  (PRN):      Allergies    No Known Allergies    Intolerances        SOCIAL HISTORY:  no tob,   no alcohol   no drugs    FAMILY HISTORY:  n/c      ROS: 14 point ROS negative other than what is present in HPI or below    Vital Signs Last 24 Hrs  T(C): 36.2 (15 Francisco Javier 2024 15:41), Max: 37.2 (15 Francisco Javier 2024 11:32)  T(F): 97.1 (15 Francisco Javier 2024 15:41), Max: 99 (15 Francisco Javier 2024 11:32)  HR: 66 (15 Francisco Javier 2024 15:41) (63 - 66)  BP: 148/74 (15 Francisco Javier 2024 15:41) (148/74 - 200/80)  BP(mean): --  RR: 16 (15 Francisco Javier 2024 15:41) (16 - 18)  SpO2: 95% (15 Francisco Javier 2024 15:41) (95% - 98%)    Parameters below as of 15 Francisco Javier 2024 15:41  Patient On (Oxygen Delivery Method): room air      General: NAD    Detailed Neurologic Exam:    Mental status: The patient is awake and alert and has normal attention span.  The patient is fully oriented in 3 spheres. The patient is oriented to current events. The patient is able to name objects, follow commands, repeat sentences.    Cranial nerves: Pupils equal and react symmetrically to light. There is no visual field deficit to confrontation. Extraocular motion is full with left gaze induced nystagmus. There is no ptosis. Facial sensation is intact. Facial musculature is symmetric. Palate elevates symmetrically. Tongue is midline.    Motor: There is normal bulk and tone.  There is no tremor.  Strength is 5/5 in the right arm and leg.   Strength is 5/5 in the left arm and leg.    Sensation: Intact to light touch and pin in 4 extremities    Cerebellar: There is no dysmetria on finger to nose testing.    Gait : deferred    LABS:                         16.4   12.08 )-----------( 387      ( 15 Francisco Javier 2024 12:21 )             48.0       01-15    133<L>  |  98  |  19.7  ----------------------------<  118<H>  4.1   |  21.0<L>  |  0.65    Ca    9.2      15 Francisco Javier 2024 12:21    TPro  7.6  /  Alb  4.1  /  TBili  0.8  /  DBili  x   /  AST  29  /  ALT  27  /  AlkPhos  97  01-15    RADIOLOGY & ADDITIONAL STUDIES (independently reviewed unless otherwise noted):  CT Head No Cont (01.15.24 @ 14:19)     IMPRESSION:  Mild chronic microvascular changes without evidence of an   acute transcortical infarction or hemorrhage. Moderate chronic sinusitis.

## 2024-01-15 NOTE — ED CDU PROVIDER INITIAL DAY NOTE - OBJECTIVE STATEMENT
63-year-old male history of hyperlipidemia, hypertension, and diabetes presents to the emergency department with dizziness over the past 3 weeks.  Patient states symptoms started 3 weeks ago when he noticed some humming sound out of his right ear and some decreased hearing on the right side as well.  He states he then became dizzy.  He describes the dizziness/spinning sensation.  States symptoms have been worsening over the past 3 weeks.  He had a CT scan performed which showed mild chronic microvascular changes but no evidence of infarction or hemorrhage.  He is placed in observation for MRI.  Neurology was also consulted. Patient states that he is feeling better since being medicated

## 2024-01-15 NOTE — ED CDU PROVIDER INITIAL DAY NOTE - CLINICAL SUMMARY MEDICAL DECISION MAKING FREE TEXT BOX
63-year-old male history of hyperlipidemia, hypertension, and diabetes presents emergency department with a 3-week history of dizziness and right ear tinnitus and hearing loss.  CT negative for infarct or hemorrhage.  Placed in observation obtain MR.  Will follow-up neurology consultation.

## 2024-01-15 NOTE — ED PROVIDER NOTE - CONTEXT
Chief Complaint   Patient presents with   • Heartburn   • GI Problem     gastroparesis       PCP: Jimmie Marroquin MD PhD  REFER: No ref. provider found    Subjective     HPI    Long history of GERD related symptoms.  GERD controlled with daily omeprazole.  She continues to experience intermittent nausea. Zofran prn and daily Domperidone provides relief.  Rarely experiences emesis. Currently undergoing testing to evaluate for mini strokes vs MS.  No weight loss.  No bright red blood per rectum, no melena.  No changes in bowels.  No abdominal pain.      Past Medical History:   Diagnosis Date   • Abdominal bloating    • Asthma    • Dehydration    • Diabetes mellitus (CMS/HCC)    • Disease of thyroid gland    • GERD (gastroesophageal reflux disease)    • History of diabetic gastroparesis    • Incontinence    • MVP (mitral valve prolapse)    • Nausea    • Neuropathy    • UTI (urinary tract infection)        Past Surgical History:   Procedure Laterality Date   • CARPAL TUNNEL RELEASE     • ENDOSCOPY  05/03/2012    normal   • GALLBLADDER SURGERY     • INTERSTIM PLACEMENT N/A 11/7/2018    Procedure: INTERSTIM STAGES 1 AND 2 LEAD AND GENERATOR PLACEMENT;  Surgeon: Johan Mcleod MD;  Location: [facility] OR;  Service: Urology   • SHOULDER SURGERY     • TRIGGER FINGER RELEASE  06/11/2020   • TUBAL ABDOMINAL LIGATION         Outpatient Medications Marked as Taking for the 6/16/20 encounter (Office Visit) with Wilner Lin APRN   Medication Sig Dispense Refill   • ARIPiprazole (ABILIFY) 5 MG tablet Take 5 mg by mouth Daily.     • aspirin 81 MG EC tablet Take 81 mg by mouth Daily.     • atorvastatin (LIPITOR) 10 MG tablet Take 10 mg by mouth Daily.     • budesonide-formoterol (SYMBICORT) 160-4.5 MCG/ACT inhaler Inhale 2 puffs 2 (Two) Times a Day.     • butalbital-aspirin-caffeine-codeine (FIORINAL WITH CODEINE) -59-30 MG capsule Take 1 capsule by mouth Every 4 (Four) Hours As Needed for Headache.     •  DULoxetine (CYMBALTA) 60 MG capsule Take 60 mg by mouth Daily.     • Erenumab-aooe (Aimovig) 140 MG/ML solution auto-injector Inject  under the skin into the appropriate area as directed.     • fluticasone (FLONASE) 50 MCG/ACT nasal spray 2 sprays into the nostril(s) as directed by provider Daily As Needed for Rhinitis or Allergies.     • Fluticasone Furoate 50 MCG/ACT aerosol powder  Inhale.     • gabapentin (NEURONTIN) 300 MG capsule Take 300 mg by mouth 2 (Two) Times a Day.     • insulin lispro (humaLOG) 100 UNIT/ML injection Inject  under the skin into the appropriate area as directed 3 (Three) Times a Day Before Meals. PUMP     • levothyroxine (SYNTHROID, LEVOTHROID) 88 MCG tablet Take 88 mcg by mouth Daily.     • lisinopril (PRINIVIL,ZESTRIL) 5 MG tablet Take 5 mg by mouth Daily.     • montelukast (SINGULAIR) 10 MG tablet Take 10 mg by mouth Every Night.     • omeprazole (priLOSEC) 20 MG capsule Take 20 mg by mouth Daily.     • rizatriptan (MAXALT) 10 MG tablet Take 10 mg by mouth.     • rOPINIRole (REQUIP) 2 MG tablet Take 2 mg by mouth Every Night.     • Unable to find Take 1 each by mouth 3 (Three) Times a Day. Med Name: domperidone   From [facility] for gastro paresis         Allergies   Allergen Reactions   • Trileptal [Oxcarbazepine] Hives       Social History     Socioeconomic History   • Marital status:      Spouse name: Not on file   • Number of children: Not on file   • Years of education: Not on file   • Highest education level: Not on file   Tobacco Use   • Smoking status: Never Smoker   • Smokeless tobacco: Never Used   Substance and Sexual Activity   • Alcohol use: Yes     Frequency: Monthly or less     Drinks per session: 1 or 2     Comment: occ   • Drug use: No   • Sexual activity: Yes     Partners: Male     Birth control/protection: Surgical       Family History   Problem Relation Age of Onset   • No Known Problems Father    • No Known Problems Mother    • No Known Problems Son    • No  "Known Problems Son    • Colon cancer Neg Hx    • Esophageal cancer Neg Hx    • Breast cancer Neg Hx    • Ovarian cancer Neg Hx        Review of Systems   Constitutional: Negative for fatigue, fever and unexpected weight change.   HENT: Negative for hearing loss, sore throat and voice change.    Eyes: Negative for visual disturbance.   Respiratory: Negative for cough, shortness of breath and wheezing.    Cardiovascular: Negative for chest pain and palpitations.   Gastrointestinal: Positive for nausea. Negative for abdominal pain, blood in stool and vomiting.   Endocrine: Negative for polydipsia and polyuria.   Genitourinary: Negative for difficulty urinating, dysuria, hematuria and urgency.   Musculoskeletal: Negative for joint swelling and myalgias.   Skin: Negative for color change, rash and wound.   Neurological: Negative for dizziness, tremors, seizures and syncope.   Hematological: Does not bruise/bleed easily.   Psychiatric/Behavioral: Negative for agitation and confusion. The patient is not nervous/anxious.        Objective     Vitals:    06/16/20 0825   BP: 112/82   Pulse: 75   Temp: 97.8 °F (36.6 °C)   SpO2: 98%   Weight: 82.4 kg (181 lb 9.6 oz)   Height: 152.4 cm (60\")     Body mass index is 35.47 kg/m².    Physical Exam   Constitutional: She is oriented to person, place, and time. She appears well-developed and well-nourished. She is cooperative.   HENT:   Head: Normocephalic and atraumatic.   Eyes: Pupils are equal, round, and reactive to light. Conjunctivae are normal. No scleral icterus.   Neck: Normal range of motion. Neck supple. No JVD present. No thyroid mass and no thyromegaly present.   Cardiovascular: Normal rate, regular rhythm and normal heart sounds. Exam reveals no gallop and no friction rub.   No murmur heard.  Pulmonary/Chest: Effort normal and breath sounds normal. No accessory muscle usage. No respiratory distress. She has no wheezes. She has no rales.   Abdominal: Soft. Normal appearance " and bowel sounds are normal. She exhibits no distension, no ascites and no mass. There is no hepatosplenomegaly. There is no tenderness. There is no rebound and no guarding.   Musculoskeletal: Normal range of motion. She exhibits no edema or tenderness.     Vascular Status -  Her right foot exhibits normal foot vasculature  and no edema. Her left foot exhibits normal foot vasculature  and no edema.  Lymphadenopathy:     She has no cervical adenopathy.   Neurological: She is alert and oriented to person, place, and time. She has normal strength. Gait normal.   Skin: Skin is warm, dry and intact. No rash noted.       Imaging Results (Most Recent)     None          Body mass index is 35.47 kg/m².    Assessment/Plan     Sharonda was seen today for heartburn and gi problem.    Diagnoses and all orders for this visit:    Nausea    Gastroesophageal reflux disease, esophagitis presence not specified        * Surgery not found *      Prescription for Domperiodone given to patient  Colonoscopy due Feb 2021 unless symptoms develop that warrants earlier procedure      Wilner Lin, ARUNA  06/16/20          There are no Patient Instructions on file for this visit.     unknown

## 2024-01-16 ENCOUNTER — NON-APPOINTMENT (OUTPATIENT)
Age: 64
End: 2024-01-16

## 2024-01-16 VITALS
RESPIRATION RATE: 16 BRPM | TEMPERATURE: 99 F | SYSTOLIC BLOOD PRESSURE: 123 MMHG | OXYGEN SATURATION: 98 % | HEART RATE: 69 BPM | DIASTOLIC BLOOD PRESSURE: 62 MMHG

## 2024-01-16 LAB
APPEARANCE UR: CLEAR — SIGNIFICANT CHANGE UP
BILIRUB UR-MCNC: NEGATIVE — SIGNIFICANT CHANGE UP
CHOLEST SERPL-MCNC: 186 MG/DL — SIGNIFICANT CHANGE UP
COLOR SPEC: YELLOW — SIGNIFICANT CHANGE UP
DIFF PNL FLD: NEGATIVE — SIGNIFICANT CHANGE UP
GLUCOSE UR QL: NEGATIVE MG/DL — SIGNIFICANT CHANGE UP
HDLC SERPL-MCNC: 52 MG/DL — SIGNIFICANT CHANGE UP
KETONES UR-MCNC: NEGATIVE MG/DL — SIGNIFICANT CHANGE UP
LEUKOCYTE ESTERASE UR-ACNC: NEGATIVE — SIGNIFICANT CHANGE UP
LIPID PNL WITH DIRECT LDL SERPL: 122 MG/DL — HIGH
NITRITE UR-MCNC: NEGATIVE — SIGNIFICANT CHANGE UP
NON HDL CHOLESTEROL: 134 MG/DL — HIGH
PH UR: 7 — SIGNIFICANT CHANGE UP (ref 5–8)
PROT UR-MCNC: SIGNIFICANT CHANGE UP MG/DL
SP GR SPEC: 1.02 — SIGNIFICANT CHANGE UP (ref 1–1.03)
TRIGL SERPL-MCNC: 60 MG/DL — SIGNIFICANT CHANGE UP
UROBILINOGEN FLD QL: 1 MG/DL — SIGNIFICANT CHANGE UP (ref 0.2–1)

## 2024-01-16 PROCEDURE — 99284 EMERGENCY DEPT VISIT MOD MDM: CPT | Mod: 25

## 2024-01-16 PROCEDURE — 36415 COLL VENOUS BLD VENIPUNCTURE: CPT

## 2024-01-16 PROCEDURE — G0378: CPT

## 2024-01-16 PROCEDURE — 93005 ELECTROCARDIOGRAM TRACING: CPT

## 2024-01-16 PROCEDURE — 80053 COMPREHEN METABOLIC PANEL: CPT

## 2024-01-16 PROCEDURE — 96374 THER/PROPH/DIAG INJ IV PUSH: CPT

## 2024-01-16 PROCEDURE — 87086 URINE CULTURE/COLONY COUNT: CPT

## 2024-01-16 PROCEDURE — 99239 HOSP IP/OBS DSCHRG MGMT >30: CPT

## 2024-01-16 PROCEDURE — 86850 RBC ANTIBODY SCREEN: CPT

## 2024-01-16 PROCEDURE — 96375 TX/PRO/DX INJ NEW DRUG ADDON: CPT

## 2024-01-16 PROCEDURE — 86900 BLOOD TYPING SEROLOGIC ABO: CPT

## 2024-01-16 PROCEDURE — 80061 LIPID PANEL: CPT

## 2024-01-16 PROCEDURE — 70450 CT HEAD/BRAIN W/O DYE: CPT | Mod: MA

## 2024-01-16 PROCEDURE — 70551 MRI BRAIN STEM W/O DYE: CPT | Mod: MA

## 2024-01-16 PROCEDURE — 86901 BLOOD TYPING SEROLOGIC RH(D): CPT

## 2024-01-16 PROCEDURE — 81003 URINALYSIS AUTO W/O SCOPE: CPT

## 2024-01-16 PROCEDURE — 71045 X-RAY EXAM CHEST 1 VIEW: CPT

## 2024-01-16 PROCEDURE — 93880 EXTRACRANIAL BILAT STUDY: CPT

## 2024-01-16 PROCEDURE — 84484 ASSAY OF TROPONIN QUANT: CPT

## 2024-01-16 PROCEDURE — 85025 COMPLETE CBC W/AUTO DIFF WBC: CPT

## 2024-01-16 RX ORDER — KETOROLAC TROMETHAMINE 30 MG/ML
15 SYRINGE (ML) INJECTION ONCE
Refills: 0 | Status: DISCONTINUED | OUTPATIENT
Start: 2024-01-16 | End: 2024-01-16

## 2024-01-16 RX ADMIN — VALSARTAN 320 MILLIGRAM(S): 80 TABLET ORAL at 05:30

## 2024-01-16 RX ADMIN — METFORMIN HYDROCHLORIDE 1000 MILLIGRAM(S): 850 TABLET ORAL at 05:30

## 2024-01-16 RX ADMIN — Medication 15 MILLIGRAM(S): at 06:19

## 2024-01-16 RX ADMIN — Medication 100 MILLIGRAM(S): at 05:30

## 2024-01-16 NOTE — ED CDU PROVIDER DISPOSITION NOTE - CARE PROVIDERS DIRECT ADDRESSES
[email or web address]

## 2024-01-16 NOTE — ED CDU PROVIDER SUBSEQUENT DAY NOTE - ATTENDING APP SHARED VISIT CONTRIBUTION OF CARE
Michelle HEATHGR-72-bfgv-old male with history of hyperlipidemia hypertension and diabetes presents with dizziness and was placed in observation unit for MRI of the head.  Was recently seen at [facility] diagnosed with sinusitis.  Patient already on antibiotics.    Patient had no acute events overnight, normal vital signs alert oriented x 3 no focal deficits S1-S2 normal regular, bilateral clear breath sounds    Patient had normal MRI and MRI also showed sinusitis plan to follow-up with ENT outpatient and continue hydration and decongestants Michelle HEATHIC-57-zqrf-old male with history of hyperlipidemia hypertension and diabetes presents with dizziness and was placed in observation unit for MRI of the head.  Was recently seen at [facility] diagnosed with sinusitis.  Patient already on antibiotics.    Patient had no acute events overnight, normal vital signs alert oriented x 3 no focal deficits S1-S2 normal regular, bilateral clear breath sounds    Patient had normal MRI and MRI also showed sinusitis plan to follow-up with ENT outpatient and continue hydration and decongestants

## 2024-01-16 NOTE — ED CDU PROVIDER SUBSEQUENT DAY NOTE - NS ED ATTENDING STATEMENT MOD
This was a shared visit with the ODIOLN. I reviewed and verified the documentation. This was a shared visit with the ODILON. I reviewed and verified the documentation.

## 2024-01-16 NOTE — ED CDU PROVIDER SUBSEQUENT DAY NOTE - CLINICAL SUMMARY MEDICAL DECISION MAKING FREE TEXT BOX
63-year-old male history of hyperlipidemia, hypertension, and diabetes presents emergency department with a 3-week history of dizziness. CT head negative  - Neuro following recommending MR, if normal can follow up with ENT   - MR head   - neuro checks   - carotid doppler Results

## 2024-01-16 NOTE — ED CDU PROVIDER DISPOSITION NOTE - NSFOLLOWUPINSTRUCTIONS_ED_ALL_ED_FT
Please follow up with ENT    Dizziness    Dizziness can manifest as a feeling of unsteadiness or light-headedness. You may feel like you are about to faint. This condition can be caused by a number of things, including medicines, dehydration, or illness. Drink enough fluid to keep your urine clear or pale yellow. Do not drink alcohol and limit your caffeine intake. Avoid quick or sudden movements.  Rise slowly from chairs and steady yourself until you feel okay. In the morning, first sit up on the side of the bed.    SEEK IMMEDIATE MEDICAL CARE IF YOU HAVE ANY OF THE FOLLOWING SYMPTOMS: vomiting, changes in your vision or speech, weakness in your arms or legs, trouble speaking or swallowing, chest pain, abdominal pain, shortness of breath, sweating, bleeding, headache, neck pain, or fever.

## 2024-01-16 NOTE — ED CDU PROVIDER DISPOSITION NOTE - PROVIDER TOKENS
PROVIDER:[TOKEN:[[id number]]],PROVIDER:[TOKEN:[[id number]]] PROVIDER:[TOKEN:[[id number]]],PROVIDER:[TOKEN:[[id number]]]

## 2024-01-16 NOTE — ED CDU PROVIDER DISPOSITION NOTE - CLINICAL COURSE
Patient with dizziness, with MRI positive for sinusitis, was evaluated at [facility] last week, treated with Augmentin, seen by neurology, given a dose of steroids with relief, stable for ENT follow up.  Given copies of all results, understands and agrees to proceed.

## 2024-01-16 NOTE — ED ADULT NURSE REASSESSMENT NOTE - NS ED NURSE REASSESS COMMENT FT1
Pt breathing even and unlabored at this time, no acute distress noted. Pt resting comfortably with no complaints, awaiting MRI results. Stretcher in lowest position, wheels locked, call bell within reach.
Pt given to observation nurses. Olinda taken report. Pt ao4 no s/s of distress noted.
assumed care of pt @ 1955 from previous RN Pilo Sharp. Pt breathing even and unlabored at this time, no acute distress noted. Pt resting comfortably, awaiting MRI. Pt able to make needs known, has no complaints at this time.
Assumed care of pt at 07:15 as stated in report from RN ALINA. Charting as noted. Patient A&O x4, denies pain/discomfort, denies CP/SOB. Updated on the plan of care. Call bell within reach, bed locked in lowest position. IV site flushed w/ NS. No redness, swelling or pain noted to site. No signs of acute distress noted, safety maintained.

## 2024-01-16 NOTE — ED CDU PROVIDER DISPOSITION NOTE - CARE PROVIDER_API CALL
Praneeth Diaz  Otolaryngology  [address], [address]  [place] [address]  Phone: [phone]  Fax: [phone]  Follow Up Time:     Wilberto Guidry  Neurology  [address], [address]  [place] [address]  Phone: [phone]  Fax: [phone]  Follow Up Time:    Praneeth Diaz  Otolaryngology  [address], [address]  [place] [address]  Phone: [phone]  Fax: [phone]  Follow Up Time:     Wilberto Guidry  Neurology  [address], [address]  [place] [address]  Phone: [phone]  Fax: [phone]  Follow Up Time:

## 2024-01-16 NOTE — ED CDU PROVIDER DISPOSITION NOTE - PATIENT PORTAL LINK FT
You can access the FollowMyHealth Patient Portal offered by [facility] by registering at the following website: [email or web address]. By joining [employer]’s FollowMyHealth portal, you will also be able to view your health information using other applications (apps) compatible with our system. You can access the FollowMyHealth Patient Portal offered by [facility] by registering at the following website: [email or web address]. By joining [employer]’s FollowMyHealth portal, you will also be able to view your health information using other applications (apps) compatible with our system.

## 2024-01-17 LAB
CULTURE RESULTS: SIGNIFICANT CHANGE UP
SPECIMEN SOURCE: SIGNIFICANT CHANGE UP

## 2024-01-19 ENCOUNTER — APPOINTMENT (OUTPATIENT)
Dept: NEUROLOGY | Facility: CLINIC | Age: 64
End: 2024-01-19
Payer: MEDICAID

## 2024-01-19 VITALS
WEIGHT: 194 LBS | HEIGHT: 64 IN | BODY MASS INDEX: 33.12 KG/M2 | SYSTOLIC BLOOD PRESSURE: 130 MMHG | DIASTOLIC BLOOD PRESSURE: 70 MMHG

## 2024-01-19 DIAGNOSIS — R42 DIZZINESS AND GIDDINESS: ICD-10-CM

## 2024-01-19 PROCEDURE — 99215 OFFICE O/P EST HI 40 MIN: CPT

## 2024-01-19 NOTE — ASSESSMENT
[FreeTextEntry1] : Dizziness likely on a labyrinthine basis, sinusitis as per reports from the hospital His neurological examination is normal ENT evaluation and treatment pending  Follow-up here as needed.

## 2024-01-19 NOTE — PHYSICAL EXAM
[General Appearance - Alert] : alert [General Appearance - In No Acute Distress] : in no acute distress [General Appearance - Well-Appearing] : healthy appearing [Oriented To Time, Place, And Person] : oriented to person, place, and time [Impaired Insight] : insight and judgment were intact [Affect] : the affect was normal [Memory Recent] : recent memory was not impaired [Person] : oriented to person [Place] : oriented to place [Time] : oriented to time [Concentration Intact] : normal concentrating ability [Fluency] : fluency intact [Comprehension] : comprehension intact [Past History] : adequate knowledge of personal past history [Cranial Nerves Optic (II)] : visual acuity intact bilaterally,  visual fields full to confrontation, pupils equal round and reactive to light [Cranial Nerves Oculomotor (III)] : extraocular motion intact [Cranial Nerves Trigeminal (V)] : facial sensation intact symmetrically [Cranial Nerves Facial (VII)] : face symmetrical [Cranial Nerves Vestibulocochlear (VIII)] : hearing was intact bilaterally [Cranial Nerves Glossopharyngeal (IX)] : tongue and palate midline [Cranial Nerves Accessory (XI - Cranial And Spinal)] : head turning and shoulder shrug symmetric [Cranial Nerves Hypoglossal (XII)] : there was no tongue deviation with protrusion [Motor Tone] : muscle tone was normal in all four extremities [Motor Strength] : muscle strength was normal in all four extremities [No Muscle Atrophy] : normal bulk in all four extremities [Paresis Pronator Drift Right-Sided] : no pronator drift on the right [Paresis Pronator Drift Left-Sided] : no pronator drift on the left [Sensation Tactile Decrease] : light touch was intact [Sensation Pain / Temperature Decrease] : pain and temperature was intact [Romberg's Sign] : Romberg's sign was negtive [Abnormal Walk] : normal gait [Balance] : balance was intact [Past-pointing] : there was no past-pointing [Tremor] : no tremor present [Coordination - Dysmetria Impaired Finger-to-Nose Bilateral] : not present [Coordination - Dysmetria Impaired Heel-to-Shin Bilateral] : not present [2+] : Ankle jerk left 2+ [PERRL With Normal Accommodation] : pupils were equal in size, round, reactive to light, with normal accommodation [Extraocular Movements] : extraocular movements were intact [Full Visual Field] : full visual field [FreeTextEntry1] : No nystagmus

## 2024-01-19 NOTE — HISTORY OF PRESENT ILLNESS
[FreeTextEntry1] : This 63-year-old man was seen in neurological evaluation in the office today. He was recently seen by our neurology group at [facility] on 1/15/2024.  Those records have been reviewed.  He presented with vertigo, right ear pain, tinnitus.  Imaging studies of the head including CAT scan and brain MRI showed sinusitis without any acute intracranial abnormalities.  He was referred to an ENT specialist with appointment pending. Carotid ultrasound in the hospital was unremarkable. He still complains of dizziness in addition to nausea and vomiting.  Denies any fever or chills  Medical history significant for hypertension, hyperlipidemia, depression, diabetes

## 2024-02-12 ENCOUNTER — APPOINTMENT (OUTPATIENT)
Dept: GASTROENTEROLOGY | Facility: CLINIC | Age: 64
End: 2024-02-12

## 2024-02-29 ENCOUNTER — NON-APPOINTMENT (OUTPATIENT)
Age: 64
End: 2024-02-29

## 2024-02-29 ENCOUNTER — APPOINTMENT (OUTPATIENT)
Dept: CARDIOLOGY | Facility: CLINIC | Age: 64
End: 2024-02-29
Payer: MEDICAID

## 2024-02-29 VITALS
HEART RATE: 69 BPM | SYSTOLIC BLOOD PRESSURE: 159 MMHG | OXYGEN SATURATION: 98 % | DIASTOLIC BLOOD PRESSURE: 80 MMHG | HEIGHT: 64 IN | WEIGHT: 195 LBS | BODY MASS INDEX: 33.29 KG/M2 | RESPIRATION RATE: 16 BRPM

## 2024-02-29 DIAGNOSIS — Z01.810 ENCOUNTER FOR PREPROCEDURAL CARDIOVASCULAR EXAMINATION: ICD-10-CM

## 2024-02-29 DIAGNOSIS — E78.5 HYPERLIPIDEMIA, UNSPECIFIED: ICD-10-CM

## 2024-02-29 DIAGNOSIS — G47.33 OBSTRUCTIVE SLEEP APNEA (ADULT) (PEDIATRIC): ICD-10-CM

## 2024-02-29 DIAGNOSIS — I10 ESSENTIAL (PRIMARY) HYPERTENSION: ICD-10-CM

## 2024-02-29 DIAGNOSIS — R94.31 ABNORMAL ELECTROCARDIOGRAM [ECG] [EKG]: ICD-10-CM

## 2024-02-29 PROCEDURE — 93000 ELECTROCARDIOGRAM COMPLETE: CPT

## 2024-02-29 PROCEDURE — 99214 OFFICE O/P EST MOD 30 MIN: CPT

## 2024-02-29 PROCEDURE — G2211 COMPLEX E/M VISIT ADD ON: CPT | Mod: NC,1L

## 2024-02-29 RX ORDER — VALSARTAN AND HYDROCHLOROTHIAZIDE 320; 25 MG/1; MG/1
320-25 TABLET, FILM COATED ORAL DAILY
Qty: 90 | Refills: 1 | Status: ACTIVE | COMMUNITY
Start: 2021-06-17 | End: 1900-01-01

## 2024-03-01 PROBLEM — Z01.810 PRE-PROCEDURAL CARDIOVASCULAR EXAMINATION: Status: ACTIVE | Noted: 2024-03-01

## 2024-03-01 NOTE — HISTORY OF PRESENT ILLNESS
[FreeTextEntry1] : Patient is a 63yo M with HLD, HTN, former smoker, obesity here for cardiac follow up and risk stratification for proposed prostate biopsy. Reports feeling well. He denies any chest pain, dizziness, syncope, near-syncope, SOB, edema, orthopnea or PND.  Amlodipine was added to his regimen on his last visit for better BP control. Has been taking Amlodipine and Metoprolol but stopped taking Valsartan-HCTZ on his own.   Sleep study was recommended on his last OV but has not been done. Reports sleep study "many years ago" and states he was told he does not need mask anymore. Does not recall where sleep study was done.    and lives with wife. Works as cook in restaurant.   ROS: GI and  negative

## 2024-03-01 NOTE — ASSESSMENT
[FreeTextEntry1] : ECG: SR, LAFB , LAE, NSST  ECHO 5/2021: 1. Borderline increased septal wall thickness 2. Normal systolic and diastolic function, EF 65-70% 3. Normal RV/LA/RA  4. Trivial MR, mild TR,. RVSP 26mmHg   CAROTID 4/2019: 1. All arteries were clearly visualized. 2. Normal ICAs 3. There is <50% stenosis of the right ECA. 4. There is antegrade flow in the right and left vertebral arteries.   EX NUCLEAR STRESS TEST 4/2019: 1. Normal Sestamibi myocardial perfusion SPECT imaging with artifact as noted above. 2. Left ventricular function was hyperdynamic with an EF of 72%. 3. The EKG was negative for ischemia. 4. The blood pressure was hypertensive baseline with normal response to exercise. 5. The patient's functional capacity was average. 6. The Duke Treadmill Score was 9, consistent with low risk.   HDL 44   (2/2019) A1C 6.3, Fasting glucose 110 (2/2019)

## 2024-03-01 NOTE — PHYSICAL EXAM
[General Appearance - Well Developed] : well developed [General Appearance - Well Nourished] : well nourished [General Appearance - In No Acute Distress] : no acute distress [Normal Oropharynx] : normal oropharynx [Normal Jugular Venous V Waves Present] : normal jugular venous V waves present [Respiration, Rhythm And Depth] : normal respiratory rhythm and effort [] : no respiratory distress [Auscultation Breath Sounds / Voice Sounds] : lungs were clear to auscultation bilaterally [Heart Rate And Rhythm] : heart rate and rhythm were normal [Heart Sounds] : normal S1 and S2 [Murmurs] : no murmurs present [Edema] : no peripheral edema present [Bowel Sounds] : normal bowel sounds [Abdomen Soft] : soft [Abdomen Tenderness] : non-tender [Abnormal Walk] : normal gait [Cyanosis, Localized] : no localized cyanosis [Nail Clubbing] : no clubbing of the fingernails [Skin Turgor] : normal skin turgor [Skin Color & Pigmentation] : normal skin color and pigmentation [Affect] : the affect was normal [Oriented To Time, Place, And Person] : oriented to person, place, and time [FreeTextEntry1] : obese [Well Developed] : well developed [No Acute Distress] : no acute distress [Well Nourished] : well nourished [Normal Venous Pressure] : normal venous pressure [Normal Conjunctiva] : normal conjunctiva [No Carotid Bruit] : no carotid bruit [Normal S1, S2] : normal S1, S2 [No Rub] : no rub [No Murmur] : no murmur [Clear Lung Fields] : clear lung fields [No Gallop] : no gallop [Good Air Entry] : good air entry [No Respiratory Distress] : no respiratory distress  [Non Tender] : non-tender [Soft] : abdomen soft [Normal Bowel Sounds] : normal bowel sounds [No Masses/organomegaly] : no masses/organomegaly [No Edema] : no edema [Normal Gait] : normal gait [No Cyanosis] : no cyanosis [No Clubbing] : no clubbing [No Rash] : no rash [No Varicosities] : no varicosities [Moves all extremities] : moves all extremities [No Skin Lesions] : no skin lesions [Normal Speech] : normal speech [No Focal Deficits] : no focal deficits [Alert and Oriented] : alert and oriented [Normal memory] : normal memory

## 2024-03-01 NOTE — DISCUSSION/SUMMARY
[FreeTextEntry1] : Patient is a 65yo M with HLD, obesity, HTN, IFG, fatty liver, former smoker here for cardiac follow up.  -Echo with borderline increased septal wall, otherwise unremarkable 5/2021  -Needs to lose weight still -Suspect FRANK complicating HTN, unclear of results many years ago. Still has not repeated study.  - BP not well controlled. Stopped taking Valsartan- HCTZ - Needs echo to evaluate minor changes in ST segments on  ECG and increase in BP.   1. Continue current antihypertensives. Resume Valsartan-HCTZ. List of medications given to patient. Check renal function in 2 weeks.  2.Still recommend sleep study, has known FRANK from years ago and needs to be treated/evaluated. Will refer again to  Lung as still not done yet and willing to do now.  3. Consider changing HCTZ to chlorthalidone at follow up or increasing CCB. Check echocardiogram, known LAFB but some changes in ST segments and will evaluate 4. Regular follow up with primary.  5. Follow up within 2 months.   Based on his stable cardiac pattern, there is no cardiac contraindication to the proposed prostate biopsy. Scheduled CV medications should be taken the morning of with small sips of water.

## 2024-03-25 ENCOUNTER — APPOINTMENT (OUTPATIENT)
Dept: CARDIOLOGY | Facility: CLINIC | Age: 64
End: 2024-03-25
Payer: MEDICAID

## 2024-03-25 PROCEDURE — 93306 TTE W/DOPPLER COMPLETE: CPT

## 2024-04-08 ENCOUNTER — APPOINTMENT (OUTPATIENT)
Dept: CARDIOLOGY | Facility: CLINIC | Age: 64
End: 2024-04-08

## 2024-04-15 ENCOUNTER — APPOINTMENT (OUTPATIENT)
Dept: CARDIOLOGY | Facility: CLINIC | Age: 64
End: 2024-04-15

## 2024-06-25 ENCOUNTER — NON-APPOINTMENT (OUTPATIENT)
Age: 64
End: 2024-06-25

## 2024-07-02 ENCOUNTER — APPOINTMENT (OUTPATIENT)
Dept: GASTROENTEROLOGY | Facility: CLINIC | Age: 64
End: 2024-07-02
Payer: MEDICAID

## 2024-07-02 VITALS
RESPIRATION RATE: 14 BRPM | HEART RATE: 75 BPM | DIASTOLIC BLOOD PRESSURE: 90 MMHG | OXYGEN SATURATION: 98 % | WEIGHT: 198 LBS | SYSTOLIC BLOOD PRESSURE: 153 MMHG | BODY MASS INDEX: 33.8 KG/M2 | HEIGHT: 64 IN

## 2024-07-02 DIAGNOSIS — K59.00 CONSTIPATION, UNSPECIFIED: ICD-10-CM

## 2024-07-02 PROCEDURE — 99214 OFFICE O/P EST MOD 30 MIN: CPT

## 2024-08-28 ENCOUNTER — RX RENEWAL (OUTPATIENT)
Age: 64
End: 2024-08-28

## 2024-09-17 NOTE — ASSESSMENT
[FreeTextEntry1] : ECG: SR,LAE, NSST \par \par ECHO 4/2019:\par 1. Borderline increased LV wall thickness.\par 2. Normal left ventricular internal cavity size.\par 3. Left ventricular ejection fraction, by visual estimation, is 60 to 65%.\par 4. Normal right ventricular size and systolic function.\par 5. Mildly dilated left atrium.\par 6. The right atrium is normal in size and structure.\par 7. Normal aortic valve, without any evidence of aortic stenosis or insufficiency.\par 8. Mild mitral valve regurgitation.\par 9. Mild tricuspid regurgitation.\par 10. Interatrial and interventricular septa appear intact\par 11. A prominent moderator band is noted in the RV.\par 12. There is no evidence of pericardial effusion.\par 13. Recommend clinical correlation with the above findings.\par \par CAROTID 4/2019:\par 1. All arteries were clearly visualized.\par 2. Normal LICA.\par 3. Normal JESUS ALBERTO.\par 4. Normal left ECA.\par 5. There is <50% stenosis of the right ECA.\par 6. There is antegrade flow in the right and left vertebral arteries.\par 7. Recommend clinical correlation with the above findings.\par \par EX NUCLEAR STRESS TEST 4/2019:\par 1. Normal Sestamibi myocardial perfusion SPECT imaging with artifact as noted above.\par 2. Left ventricular function was hyperdynamic with an EF of 72%.\par 3. The EKG was negative for ischemia.\par 4. The patient developed leg fatigue during the stress exam. The symptoms resolved with rest.\par 5. The test was terminated due to leg fatigue.\par 6. The blood pressure was hypertensive baseline with normal response to exercise.\par 7. The patient developed no dysrhythmias during exercise.\par 8. The patient's functional capacity was average.\par 9. The Duke Treadmill Score was 9, consistent with low risk.\par 10. When compared to prior study dated 2014, there has been no significant interval change.\par 11. Recommend clinical correlation with the above findings.\par \par  HDL 44   (2/2019)\par A1C 6.3, Fasting glucose 110 (2/2019) None

## 2024-11-22 ENCOUNTER — APPOINTMENT (OUTPATIENT)
Dept: GASTROENTEROLOGY | Facility: CLINIC | Age: 64
End: 2024-11-22

## 2025-06-02 ENCOUNTER — OUTPATIENT (OUTPATIENT)
Dept: OUTPATIENT SERVICES | Facility: HOSPITAL | Age: 65
LOS: 1 days | End: 2025-06-02
Payer: COMMERCIAL

## 2025-06-02 ENCOUNTER — TRANSCRIPTION ENCOUNTER (OUTPATIENT)
Age: 65
End: 2025-06-02

## 2025-06-02 DIAGNOSIS — M54.16 RADICULOPATHY, LUMBAR REGION: ICD-10-CM

## 2025-06-02 LAB — GLUCOSE BLDC GLUCOMTR-MCNC: 120 MG/DL — HIGH (ref 70–99)

## 2025-06-02 PROCEDURE — 82962 GLUCOSE BLOOD TEST: CPT

## 2025-06-02 PROCEDURE — 77003 FLUOROGUIDE FOR SPINE INJECT: CPT

## 2025-06-02 PROCEDURE — 64483 NJX AA&/STRD TFRM EPI L/S 1: CPT | Mod: LT

## (undated) DEVICE — OMNIPAQUE 180MG/ML 10ML 10/BX